# Patient Record
Sex: FEMALE | Race: WHITE | NOT HISPANIC OR LATINO | Employment: FULL TIME | URBAN - METROPOLITAN AREA
[De-identification: names, ages, dates, MRNs, and addresses within clinical notes are randomized per-mention and may not be internally consistent; named-entity substitution may affect disease eponyms.]

---

## 2020-03-05 LAB
ABO + RH BLD: NORMAL
ABO + RH BLD: NORMAL
BLD GP AB SCN SERPL QL: NEGATIVE
C TRACH DNA SPEC QL PROBE+SIG AMP: NEGATIVE
HBV SURFACE AG SERPL QL IA: NON REACTIVE
HIV 1+2 AB+HIV1 P24 AG SERPL QL IA: NON REACTIVE
N GONORRHOEA DNA SPEC QL PROBE+SIG AMP: NEGATIVE
RUBELLA ABY IGG: NORMAL
TREPONEMA ANTIBODIES: NON REACTIVE

## 2020-05-05 ENCOUNTER — MEDICAL CORRESPONDENCE (OUTPATIENT)
Dept: HEALTH INFORMATION MANAGEMENT | Facility: CLINIC | Age: 35
End: 2020-05-05

## 2020-05-05 ENCOUNTER — TRANSFERRED RECORDS (OUTPATIENT)
Dept: HEALTH INFORMATION MANAGEMENT | Facility: CLINIC | Age: 35
End: 2020-05-05

## 2020-05-05 ENCOUNTER — PRE VISIT (OUTPATIENT)
Dept: MATERNAL FETAL MEDICINE | Facility: CLINIC | Age: 35
End: 2020-05-05

## 2020-05-05 ENCOUNTER — TRANSCRIBE ORDERS (OUTPATIENT)
Dept: MATERNAL FETAL MEDICINE | Facility: CLINIC | Age: 35
End: 2020-05-05

## 2020-05-05 DIAGNOSIS — O26.90 PREGNANCY RELATED CONDITION, ANTEPARTUM: Primary | ICD-10-CM

## 2020-05-06 ENCOUNTER — HOSPITAL ENCOUNTER (OUTPATIENT)
Dept: ULTRASOUND IMAGING | Facility: CLINIC | Age: 35
End: 2020-05-06
Attending: OBSTETRICS & GYNECOLOGY
Payer: COMMERCIAL

## 2020-05-06 ENCOUNTER — OFFICE VISIT (OUTPATIENT)
Dept: MATERNAL FETAL MEDICINE | Facility: CLINIC | Age: 35
End: 2020-05-06
Attending: OBSTETRICS & GYNECOLOGY
Payer: COMMERCIAL

## 2020-05-06 DIAGNOSIS — O26.90 PREGNANCY RELATED CONDITION, ANTEPARTUM: ICD-10-CM

## 2020-05-06 DIAGNOSIS — O35.BXX0 ECHOGENIC FOCUS OF HEART OF FETUS AFFECTING ANTEPARTUM CARE OF MOTHER, SINGLE OR UNSPECIFIED FETUS: Primary | ICD-10-CM

## 2020-05-06 DIAGNOSIS — O43.122 VELAMENTOUS INSERTION OF UMBILICAL CORD IN SECOND TRIMESTER: ICD-10-CM

## 2020-05-06 PROCEDURE — 76811 OB US DETAILED SNGL FETUS: CPT

## 2020-08-26 LAB — GROUP B STREP PCR: NEGATIVE

## 2020-09-28 ENCOUNTER — ANESTHESIA EVENT (OUTPATIENT)
Dept: OBGYN | Facility: CLINIC | Age: 35
End: 2020-09-28
Payer: COMMERCIAL

## 2020-09-28 ENCOUNTER — ANESTHESIA (OUTPATIENT)
Dept: OBGYN | Facility: CLINIC | Age: 35
End: 2020-09-28
Payer: COMMERCIAL

## 2020-09-28 ENCOUNTER — HOSPITAL ENCOUNTER (INPATIENT)
Facility: CLINIC | Age: 35
LOS: 4 days | Discharge: HOME | End: 2020-10-02
Attending: MIDWIFE | Admitting: OBSTETRICS & GYNECOLOGY
Payer: COMMERCIAL

## 2020-09-28 DIAGNOSIS — Z98.891 S/P C-SECTION: Primary | ICD-10-CM

## 2020-09-28 LAB
ABO + RH BLD: NORMAL
BLD GP AB SCN SERPL QL: NORMAL
BLOOD BANK CMNT PATIENT-IMP: NORMAL
ERYTHROCYTE [DISTWIDTH] IN BLOOD BY AUTOMATED COUNT: 13.5 % (ref 10–15)
HCT VFR BLD AUTO: 39.5 % (ref 35–47)
HGB BLD-MCNC: 13.2 G/DL (ref 11.7–15.7)
LABORATORY COMMENT REPORT: NORMAL
MCH RBC QN AUTO: 31.6 PG (ref 26.5–33)
MCHC RBC AUTO-ENTMCNC: 33.4 G/DL (ref 31.5–36.5)
MCV RBC AUTO: 95 FL (ref 78–100)
PLATELET # BLD AUTO: 255 10E9/L (ref 150–450)
RBC # BLD AUTO: 4.18 10E12/L (ref 3.8–5.2)
RUPTURE OF FETAL MEMBRANES BY ROM PLUS: POSITIVE
SARS-COV-2 RNA SPEC QL NAA+PROBE: NEGATIVE
SARS-COV-2 RNA SPEC QL NAA+PROBE: NORMAL
SPECIMEN EXP DATE BLD: NORMAL
SPECIMEN EXP DATE BLD: NORMAL
SPECIMEN SOURCE: NORMAL
SPECIMEN SOURCE: NORMAL
T PALLIDUM AB SER QL: NONREACTIVE
WBC # BLD AUTO: 19.3 10E9/L (ref 4–11)

## 2020-09-28 PROCEDURE — U0003 INFECTIOUS AGENT DETECTION BY NUCLEIC ACID (DNA OR RNA); SEVERE ACUTE RESPIRATORY SYNDROME CORONAVIRUS 2 (SARS-COV-2) (CORONAVIRUS DISEASE [COVID-19]), AMPLIFIED PROBE TECHNIQUE, MAKING USE OF HIGH THROUGHPUT TECHNOLOGIES AS DESCRIBED BY CMS-2020-01-R: HCPCS | Performed by: MIDWIFE

## 2020-09-28 PROCEDURE — 25000128 H RX IP 250 OP 636: Performed by: ANESTHESIOLOGY

## 2020-09-28 PROCEDURE — 12000000 ZZH R&B MED SURG/OB

## 2020-09-28 PROCEDURE — 86900 BLOOD TYPING SEROLOGIC ABO: CPT | Performed by: MIDWIFE

## 2020-09-28 PROCEDURE — 37000008 ZZH ANESTHESIA TECHNICAL FEE, 1ST 30 MIN: Performed by: OBSTETRICS & GYNECOLOGY

## 2020-09-28 PROCEDURE — 37000011 ZZH ANESTHESIA WARD SERVICE

## 2020-09-28 PROCEDURE — 59025 FETAL NON-STRESS TEST: CPT

## 2020-09-28 PROCEDURE — 85027 COMPLETE CBC AUTOMATED: CPT | Performed by: MIDWIFE

## 2020-09-28 PROCEDURE — 36415 COLL VENOUS BLD VENIPUNCTURE: CPT | Performed by: MIDWIFE

## 2020-09-28 PROCEDURE — 84112 EVAL AMNIOTIC FLUID PROTEIN: CPT | Performed by: MIDWIFE

## 2020-09-28 PROCEDURE — 25800030 ZZH RX IP 258 OP 636: Performed by: NURSE ANESTHETIST, CERTIFIED REGISTERED

## 2020-09-28 PROCEDURE — 25000128 H RX IP 250 OP 636: Performed by: NURSE ANESTHETIST, CERTIFIED REGISTERED

## 2020-09-28 PROCEDURE — 86850 RBC ANTIBODY SCREEN: CPT | Performed by: MIDWIFE

## 2020-09-28 PROCEDURE — 36000056 ZZH SURGERY LEVEL 3 1ST 30 MIN: Performed by: OBSTETRICS & GYNECOLOGY

## 2020-09-28 PROCEDURE — 25000125 ZZHC RX 250: Performed by: NURSE ANESTHETIST, CERTIFIED REGISTERED

## 2020-09-28 PROCEDURE — 3E0R3BZ INTRODUCTION OF ANESTHETIC AGENT INTO SPINAL CANAL, PERCUTANEOUS APPROACH: ICD-10-PCS | Performed by: ANESTHESIOLOGY

## 2020-09-28 PROCEDURE — 71000012 ZZH RECOVERY PHASE 1 LEVEL 1 FIRST HR: Performed by: OBSTETRICS & GYNECOLOGY

## 2020-09-28 PROCEDURE — 37000009 ZZH ANESTHESIA TECHNICAL FEE, EACH ADDTL 15 MIN: Performed by: OBSTETRICS & GYNECOLOGY

## 2020-09-28 PROCEDURE — 27210794 ZZH OR GENERAL SUPPLY STERILE: Performed by: OBSTETRICS & GYNECOLOGY

## 2020-09-28 PROCEDURE — 86901 BLOOD TYPING SEROLOGIC RH(D): CPT | Performed by: MIDWIFE

## 2020-09-28 PROCEDURE — 25000125 ZZHC RX 250: Performed by: MIDWIFE

## 2020-09-28 PROCEDURE — 25000132 ZZH RX MED GY IP 250 OP 250 PS 637: Performed by: MIDWIFE

## 2020-09-28 PROCEDURE — 25800030 ZZH RX IP 258 OP 636: Performed by: MIDWIFE

## 2020-09-28 PROCEDURE — G0463 HOSPITAL OUTPT CLINIC VISIT: HCPCS | Mod: 25

## 2020-09-28 PROCEDURE — 36000058 ZZH SURGERY LEVEL 3 EA 15 ADDTL MIN: Performed by: OBSTETRICS & GYNECOLOGY

## 2020-09-28 PROCEDURE — 00HU33Z INSERTION OF INFUSION DEVICE INTO SPINAL CANAL, PERCUTANEOUS APPROACH: ICD-10-PCS | Performed by: ANESTHESIOLOGY

## 2020-09-28 PROCEDURE — 25000128 H RX IP 250 OP 636: Performed by: MIDWIFE

## 2020-09-28 PROCEDURE — 86780 TREPONEMA PALLIDUM: CPT | Performed by: MIDWIFE

## 2020-09-28 RX ORDER — FENTANYL CITRATE 50 UG/ML
50-100 INJECTION, SOLUTION INTRAMUSCULAR; INTRAVENOUS
Status: DISCONTINUED | OUTPATIENT
Start: 2020-09-28 | End: 2020-09-29

## 2020-09-28 RX ORDER — LIDOCAINE 40 MG/G
CREAM TOPICAL
Status: DISCONTINUED | OUTPATIENT
Start: 2020-09-28 | End: 2020-09-29

## 2020-09-28 RX ORDER — OXYTOCIN/0.9 % SODIUM CHLORIDE 30/500 ML
PLASTIC BAG, INJECTION (ML) INTRAVENOUS PRN
Status: DISCONTINUED | OUTPATIENT
Start: 2020-09-28 | End: 2020-09-28

## 2020-09-28 RX ORDER — AZITHROMYCIN 500 MG/1
500 INJECTION, POWDER, LYOPHILIZED, FOR SOLUTION INTRAVENOUS
Status: COMPLETED | OUTPATIENT
Start: 2020-09-28 | End: 2020-09-28

## 2020-09-28 RX ORDER — TERBUTALINE SULFATE 1 MG/ML
0.25 INJECTION, SOLUTION SUBCUTANEOUS
Status: DISCONTINUED | OUTPATIENT
Start: 2020-09-28 | End: 2020-09-29

## 2020-09-28 RX ORDER — OXYTOCIN/0.9 % SODIUM CHLORIDE 30/500 ML
PLASTIC BAG, INJECTION (ML) INTRAVENOUS CONTINUOUS PRN
Status: DISCONTINUED | OUTPATIENT
Start: 2020-09-28 | End: 2020-09-29

## 2020-09-28 RX ORDER — ONDANSETRON 2 MG/ML
4 INJECTION INTRAMUSCULAR; INTRAVENOUS EVERY 6 HOURS PRN
Status: DISCONTINUED | OUTPATIENT
Start: 2020-09-28 | End: 2020-09-29

## 2020-09-28 RX ORDER — ONDANSETRON 4 MG/1
4 TABLET, ORALLY DISINTEGRATING ORAL EVERY 6 HOURS PRN
Status: DISCONTINUED | OUTPATIENT
Start: 2020-09-28 | End: 2020-09-29

## 2020-09-28 RX ORDER — ROPIVACAINE HYDROCHLORIDE 2 MG/ML
10 INJECTION, SOLUTION EPIDURAL; INFILTRATION; PERINEURAL ONCE
Status: COMPLETED | OUTPATIENT
Start: 2020-09-28 | End: 2020-09-28

## 2020-09-28 RX ORDER — SODIUM CHLORIDE, SODIUM LACTATE, POTASSIUM CHLORIDE, CALCIUM CHLORIDE 600; 310; 30; 20 MG/100ML; MG/100ML; MG/100ML; MG/100ML
INJECTION, SOLUTION INTRAVENOUS CONTINUOUS
Status: DISCONTINUED | OUTPATIENT
Start: 2020-09-28 | End: 2020-09-29

## 2020-09-28 RX ORDER — MISOPROSTOL 100 UG/1
25 TABLET ORAL
Status: DISCONTINUED | OUTPATIENT
Start: 2020-09-28 | End: 2020-09-29

## 2020-09-28 RX ORDER — NALBUPHINE HYDROCHLORIDE 10 MG/ML
2.5-5 INJECTION, SOLUTION INTRAMUSCULAR; INTRAVENOUS; SUBCUTANEOUS EVERY 6 HOURS PRN
Status: DISCONTINUED | OUTPATIENT
Start: 2020-09-28 | End: 2020-09-29

## 2020-09-28 RX ORDER — ONDANSETRON 2 MG/ML
INJECTION INTRAMUSCULAR; INTRAVENOUS PRN
Status: DISCONTINUED | OUTPATIENT
Start: 2020-09-28 | End: 2020-09-29

## 2020-09-28 RX ORDER — ESCITALOPRAM OXALATE 10 MG/1
10 TABLET ORAL DAILY
Status: ON HOLD | COMMUNITY
End: 2023-12-02

## 2020-09-28 RX ORDER — EPHEDRINE SULFATE 50 MG/ML
5 INJECTION, SOLUTION INTRAMUSCULAR; INTRAVENOUS; SUBCUTANEOUS
Status: DISCONTINUED | OUTPATIENT
Start: 2020-09-28 | End: 2020-09-29

## 2020-09-28 RX ORDER — TRANEXAMIC ACID 10 MG/ML
1 INJECTION, SOLUTION INTRAVENOUS EVERY 30 MIN PRN
Status: DISCONTINUED | OUTPATIENT
Start: 2020-09-28 | End: 2020-09-29

## 2020-09-28 RX ORDER — MORPHINE SULFATE 1 MG/ML
INJECTION, SOLUTION EPIDURAL; INTRATHECAL; INTRAVENOUS PRN
Status: DISCONTINUED | OUTPATIENT
Start: 2020-09-28 | End: 2020-09-29

## 2020-09-28 RX ORDER — LIDOCAINE HYDROCHLORIDE AND EPINEPHRINE BITARTRATE 20; .01 MG/ML; MG/ML
INJECTION, SOLUTION SUBCUTANEOUS PRN
Status: DISCONTINUED | OUTPATIENT
Start: 2020-09-28 | End: 2020-09-29

## 2020-09-28 RX ORDER — CARBOPROST TROMETHAMINE 250 UG/ML
250 INJECTION, SOLUTION INTRAMUSCULAR
Status: DISCONTINUED | OUTPATIENT
Start: 2020-09-28 | End: 2020-09-29

## 2020-09-28 RX ORDER — CEFAZOLIN SODIUM 2 G/100ML
2 INJECTION, SOLUTION INTRAVENOUS
Status: COMPLETED | OUTPATIENT
Start: 2020-09-28 | End: 2020-09-28

## 2020-09-28 RX ORDER — NALOXONE HYDROCHLORIDE 0.4 MG/ML
.1-.4 INJECTION, SOLUTION INTRAMUSCULAR; INTRAVENOUS; SUBCUTANEOUS
Status: DISCONTINUED | OUTPATIENT
Start: 2020-09-28 | End: 2020-09-29

## 2020-09-28 RX ORDER — OXYTOCIN/0.9 % SODIUM CHLORIDE 30/500 ML
1-24 PLASTIC BAG, INJECTION (ML) INTRAVENOUS CONTINUOUS
Status: DISCONTINUED | OUTPATIENT
Start: 2020-09-28 | End: 2020-09-29

## 2020-09-28 RX ORDER — ACETAMINOPHEN 325 MG/1
650 TABLET ORAL EVERY 4 HOURS PRN
Status: DISCONTINUED | OUTPATIENT
Start: 2020-09-28 | End: 2020-09-29

## 2020-09-28 RX ORDER — OXYTOCIN 10 [USP'U]/ML
10 INJECTION, SOLUTION INTRAMUSCULAR; INTRAVENOUS
Status: DISCONTINUED | OUTPATIENT
Start: 2020-09-28 | End: 2020-09-29

## 2020-09-28 RX ORDER — OXYTOCIN/0.9 % SODIUM CHLORIDE 30/500 ML
100-340 PLASTIC BAG, INJECTION (ML) INTRAVENOUS CONTINUOUS PRN
Status: DISCONTINUED | OUTPATIENT
Start: 2020-09-28 | End: 2020-09-29

## 2020-09-28 RX ORDER — METHYLERGONOVINE MALEATE 0.2 MG/ML
200 INJECTION INTRAVENOUS
Status: DISCONTINUED | OUTPATIENT
Start: 2020-09-28 | End: 2020-09-29

## 2020-09-28 RX ORDER — IBUPROFEN 400 MG/1
800 TABLET, FILM COATED ORAL
Status: DISCONTINUED | OUTPATIENT
Start: 2020-09-28 | End: 2020-09-29

## 2020-09-28 RX ORDER — CEFAZOLIN SODIUM 1 G/3ML
1 INJECTION, POWDER, FOR SOLUTION INTRAMUSCULAR; INTRAVENOUS SEE ADMIN INSTRUCTIONS
Status: DISCONTINUED | OUTPATIENT
Start: 2020-09-28 | End: 2020-09-29

## 2020-09-28 RX ORDER — CITRIC ACID/SODIUM CITRATE 334-500MG
30 SOLUTION, ORAL ORAL
Status: COMPLETED | OUTPATIENT
Start: 2020-09-28 | End: 2020-09-28

## 2020-09-28 RX ORDER — OXYCODONE AND ACETAMINOPHEN 5; 325 MG/1; MG/1
1 TABLET ORAL
Status: DISCONTINUED | OUTPATIENT
Start: 2020-09-28 | End: 2020-09-29

## 2020-09-28 RX ADMIN — Medication 340 ML/HR: at 23:14

## 2020-09-28 RX ADMIN — ONDANSETRON 4 MG: 2 INJECTION INTRAMUSCULAR; INTRAVENOUS at 23:06

## 2020-09-28 RX ADMIN — CEFAZOLIN SODIUM 2 G: 2 INJECTION, SOLUTION INTRAVENOUS at 22:45

## 2020-09-28 RX ADMIN — SODIUM CHLORIDE, POTASSIUM CHLORIDE, SODIUM LACTATE AND CALCIUM CHLORIDE: 600; 310; 30; 20 INJECTION, SOLUTION INTRAVENOUS at 12:50

## 2020-09-28 RX ADMIN — MISOPROSTOL 25 MCG: 100 TABLET ORAL at 08:46

## 2020-09-28 RX ADMIN — SODIUM CITRATE AND CITRIC ACID MONOHYDRATE 30 ML: 500; 334 SOLUTION ORAL at 22:35

## 2020-09-28 RX ADMIN — LIDOCAINE HYDROCHLORIDE AND EPINEPHRINE BITARTRATE 5 ML: 20; .01 INJECTION, SOLUTION SUBCUTANEOUS at 22:45

## 2020-09-28 RX ADMIN — Medication 12 ML/HR: at 19:08

## 2020-09-28 RX ADMIN — AZITHROMYCIN MONOHYDRATE 500 MG: 500 INJECTION, POWDER, LYOPHILIZED, FOR SOLUTION INTRAVENOUS at 22:57

## 2020-09-28 RX ADMIN — PHENYLEPHRINE HYDROCHLORIDE 0.5 MCG/KG/MIN: 10 INJECTION INTRAVENOUS at 22:45

## 2020-09-28 RX ADMIN — PHENYLEPHRINE HYDROCHLORIDE 100 MCG: 10 INJECTION INTRAVENOUS at 23:05

## 2020-09-28 RX ADMIN — LIDOCAINE HYDROCHLORIDE AND EPINEPHRINE BITARTRATE 5 ML: 20; .01 INJECTION, SOLUTION SUBCUTANEOUS at 22:51

## 2020-09-28 RX ADMIN — MISOPROSTOL 25 MCG: 100 TABLET ORAL at 06:44

## 2020-09-28 RX ADMIN — ROPIVACAINE HYDROCHLORIDE 10 ML: 2 INJECTION, SOLUTION EPIDURAL; INFILTRATION at 19:05

## 2020-09-28 RX ADMIN — LIDOCAINE HYDROCHLORIDE AND EPINEPHRINE BITARTRATE 5 ML: 20; .01 INJECTION, SOLUTION SUBCUTANEOUS at 22:55

## 2020-09-28 RX ADMIN — SODIUM CHLORIDE, POTASSIUM CHLORIDE, SODIUM LACTATE AND CALCIUM CHLORIDE 1000 ML: 600; 310; 30; 20 INJECTION, SOLUTION INTRAVENOUS at 18:20

## 2020-09-28 RX ADMIN — MIDAZOLAM 1 MG: 1 INJECTION INTRAMUSCULAR; INTRAVENOUS at 23:19

## 2020-09-28 RX ADMIN — Medication 2 MILLI-UNITS/MIN: at 12:55

## 2020-09-28 RX ADMIN — MORPHINE SULFATE 0.1 MG: 1 INJECTION, SOLUTION EPIDURAL; INTRATHECAL; INTRAVENOUS at 23:18

## 2020-09-28 ASSESSMENT — LIFESTYLE VARIABLES
TOBACCO_USE: 0
TOBACCO_USE: 0

## 2020-09-28 NOTE — PLAN OF CARE
Took over patient care at 0715. At 0730 Emani GILBERT here and was updated. Plan of care gone over with patient and spouse. At 0846 @nd dose of Oral Cytotec given. At 1045 Emani GILBERT updated on maternal status and fht's. Ordered not to given anynore cytotec due to possibly starting pitocin soon Emani will be here to assess and make plan. At 1225 SVE done and plan of care gone over with patient. Pitocin will be started soon. At 1255 Pitocin started per orders. At 1415 Loss of capture with shaheen so patch replaced.  At this time as well Patient starting to feel more. At 1530 Emani GILBERT upadted on maternal status and fht's and reviewed strip and decels. At 1655 Emani GILBERT at bedside and SVE done. Reviewed strip and decels at this time. At 1758 Patient requested Nitrous oxide and consent signed. Patient tried use for 10 min and then declined use due not liking after two contractions. At 1820 Patinet requested an epidural. At 1830 Emani GILBERT called and updated on maternal status and fht's. At 1835 SVE done due to patient request. At 1855 Dr. Aguero in room for epidural procedure. Timeout done. At 1920 Report to Kaylah HUERTA RN to take over patient cares. At 1944 Emani GILBERT called and upadte on mater nal status and fh't and decels. She will be coming now to assess patient.

## 2020-09-28 NOTE — PLAN OF CARE
Patient reports increased intensity and frequency of contractions. Rating the pain 2/10 and lower uterine cramping. When discussing plan of care with patient for continuous monitoring to begin at 0500 in preparation for PO cytotec admin, patient requests the time to be 0600 to start monitoring. RN will perform IA at 0500.

## 2020-09-28 NOTE — PROVIDER NOTIFICATION
09/28/20 0635   Provider Notification   Provider Name/Title PARK Guajardo CNM   Method of Notification Electronic Page   Request Evaluate - Remote   Notification Reason Status Update   Per provider to notify when starting PO cytotec and cervical ripening order set.

## 2020-09-28 NOTE — PROVIDER NOTIFICATION
09/28/20 0208   Provider Notification   Provider Name/Title PARK Guajardo CNM   Method of Notification Electronic Page;Phone   Request Evaluate - Remote   Notification Reason Status Update   Provider approves awaiting until PO cytotec is started to place IV saline lock. Pt care order taken and entered.

## 2020-09-28 NOTE — ANESTHESIA PREPROCEDURE EVALUATION
Anesthesia Pre-Procedure Evaluation    Patient: Alena Hall   MRN: [id number] : 1985          Preoperative Diagnosis: * No surgery found *        Past Medical History:   Diagnosis Date     Anxiety      Depression     on meds     Homozygous MTHFR mutation C677T (H) 2013    normal homocystein level     No past surgical history on file.    Anesthesia Evaluation     . Pt has had prior anesthetic.     No history of anesthetic complications          ROS/MED HX    ENT/Pulmonary:      (-) tobacco use, asthma and sleep apnea   Neurologic:       Cardiovascular:         METS/Exercise Tolerance:     Hematologic:         Musculoskeletal:         GI/Hepatic:        (-) GERD   Renal/Genitourinary:         Endo:         Psychiatric:         Infectious Disease:         Malignancy:         Other:                          Physical Exam  Normal systems: dental    Airway   Mallampati: II  TM distance: >3 FB  Neck ROM: full    Dental     Cardiovascular   Rhythm and rate: regular and normal      Pulmonary    breath sounds clear to auscultation            Lab Results   Component Value Date    PTT 31 2013    INR 1.0 2013       Preop Vitals  BP Readings from Last 3 Encounters:   20 136/84   14 135/89    Pulse Readings from Last 3 Encounters:   20 97   14 87      Resp Readings from Last 3 Encounters:   20 16   14 17    SpO2 Readings from Last 3 Encounters:   14 99%      Temp Readings from Last 1 Encounters:   20 36.6  C (97.8  F) (Temporal)    Ht Readings from Last 1 Encounters:   No data found for Ht      Wt Readings from Last 1 Encounters:   14 94.3 kg (208 lb)    There is no height or weight on file to calculate BMI.       Anesthesia Plan      History & Physical Review  History and physical reviewed and following examination; no interval change.    ASA Status:  1 .        Plan for Epidural            Postoperative Care  Postoperative pain management:   Neuraxial analgesia.      Consents  Anesthetic plan, risks, benefits and alternatives discussed with:  Patient..                 Flora Aguero

## 2020-09-28 NOTE — PLAN OF CARE
Patient and spouse oriented to room, call light, bathroom, and thermostat. Per patient request, waiting 3 hours to start PO cytotec, with intermittent auscultation until start of medication. Patient plans to rest. No change in strength of contractions at this time, patient reports tightening. Ice water given, call light in reach. Plan to start 1 hour auscultation at 0300. Pt agrees with plan.

## 2020-09-28 NOTE — PROGRESS NOTES
OB Progress Note  2020  12:36 PM    S:  Pt comfortable. Feeling occasional contractions. Ambulating and eating lunch  No other complaints.      O:  /63   Pulse 76   Temp 98.7  F (37.1  C) (Temporal)   Resp 16   LMP 2019   EFM: baseline 140, accelerations present, occasional variable decelerations, moderate variability; Category 2  [place]:  Ctx q5-9 min  SVE:  1/70%/-2  Membranes: PROM @ 2300 on , clear fluid        A/P:  35 year old  @40w6d admitted with PROM  1.  Routine cares   2. Augmentation of labor: s/p PO cytotec x 2. Last dose @ 0846. R/B/A of additional cytotec vs. Pitocin discussed. Plan to start IV pitocin.   3. ROM x 13 hrs: afebrile, VSS  4. FHR: Cat II, overall reassuring. Continuous monitoring  5. Pain relief per patient request.  6. Reassess 4 hr or PRN  7. Anticipate NSVB    Rosa Murdock CNM

## 2020-09-28 NOTE — H&P
OB Brief Admit H&P    No significant change in general health status based on examination of the patient, review of Nursing Admission Database and prenatal record.    Pt is a 35 year old  @ 40w6d who presented to L&D with reports of PROM at 2300 on 2020. Reports moderate amt of clear fluid accompanied by mild cramping.    Patient's prenatal course has been complicated by velamentous cord insertion, CPC and EIF on 20wk anatomy scan (Mat 21 normal), hx anxiety/depression (not on meds), obesity.     Prenatal Labs:    Blood type O+  Rubella immune    GBS negative  Mat 21: WNL    EFW: 8lbs    /74   Pulse 72   Temp 97.5  F (36.4  C) (Temporal)   Resp 18   LMP 2019   EFM:  Cat I: baseline 130, accels present, no decels, moderate variability  [place]: 5-7 ctx / min. Mild to palpation and to patient  SVE: 1/70%/-2 on admission  Membranes: PROM at 2300, clear fluid    Assessment:  35 year old  @ 40w6d admitted for PROM.     Plan:  1. Admit to labor and delivery   2. Pt elected for expectant management overnight. Still with mild cramping early this morning. R/B/A labor augmentation discussed and pt agrees to proceed. 1st dose PO cytotec 25mcg administered 0645.  3. ROM x 9 hrs: afebrile, VSS  4. FHR: Cat I. Continuous monitoring  5. Covid swab negative  6. Pain relief per patient request. Planning unmedicated delivery.   7. Reassess 2-4 hr or PRN  8. Anticipate LEATHA Murdock CNM  2020  7:53 AM

## 2020-09-28 NOTE — PLAN OF CARE
Data: Patient presented to [facility] at 0056.   Reason for maternal/fetal assessment per patient is Spontaneous Rupture of Membrane  Patient is a . Prenatal record reviewed.   Gestational Age 40w6d. VSS. Fetal movement present. Patient states her water broke at 2300 and she is having mild contractions. Patient denies backache, vaginal discharge, pelvic pressure, UTI symptoms, GI problems, bloody show, vaginal bleeding, edema, headache, visual disturbances, epigastric or URQ pain, abdominal pain. Support person present.  Action: Verbal consent for EFM. Triage assessment completed. EFM applied for fetal wellbeing. Uterine assessment completed; occasional contractions noted. Fetal assessment: Presumed adequate fetal oxygenation documented (see flow record).   Response: Linh Guajardo CNM informed of patient arrival, ROM+ results, SVE, contractions, and FHR tracing. Plan per provider is admit to L&D. Patient verbalized agreement with plan. Patient transferred to room 212 ambulatory, oriented to room and call light. Report given to Sahra Davis RN.

## 2020-09-28 NOTE — PROGRESS NOTES
OB Progress Note  2020  5:34 PM    S:  Pt increasingly uncomfortable, working hard through contractions. Doing well with labor support from her aunt (acting as her ) and . Has been awake for 36 hrs and is very tired. Asking about pain control options.       O:  /84   Pulse 97   Temp 97.8  F (36.6  C) (Temporal)   Resp 16   LMP 2019   EFM: baseline 140, accelerations present, variable and early decelerations, moderate variability; Category II  [place]:  Ctx q3 min, some irritability on [place] monitor  SVE:  3/80%/-2  Membranes: clear fluid    Pitocin at 10 mU/min    A/P:  35 year old  @40w6d admitted with PROM  1.  Routine cares  2.  Augmentation of labor: s/p expectant management, PO cytotec x 2, now with IV pitocin  3. ROM x 16 hrs: afebrile, VSS  4. FHR: Cat II, overall reassuring. Continuous monitoring.   5. Pain relief options reviewed w/ pt. Per request.   6. Reassess 4 hr or PRN  7.  Anticipate     Rosa Murdock CNM

## 2020-09-28 NOTE — PLAN OF CARE
After obtaining verbal consent from patient, nasopharyngeal swab for COVID-19 test performed. Specimen labeled with patient label and hand delivered to laboratory.

## 2020-09-29 PROBLEM — Z98.891 S/P C-SECTION: Status: ACTIVE | Noted: 2020-09-29

## 2020-09-29 LAB — HGB BLD-MCNC: 11.8 G/DL (ref 11.7–15.7)

## 2020-09-29 PROCEDURE — 85018 HEMOGLOBIN: CPT | Performed by: MIDWIFE

## 2020-09-29 PROCEDURE — 25000125 ZZHC RX 250: Performed by: OBSTETRICS & GYNECOLOGY

## 2020-09-29 PROCEDURE — 12000035 ZZH R&B POSTPARTUM

## 2020-09-29 PROCEDURE — 25000132 ZZH RX MED GY IP 250 OP 250 PS 637: Performed by: OBSTETRICS & GYNECOLOGY

## 2020-09-29 PROCEDURE — 25800030 ZZH RX IP 258 OP 636: Performed by: MIDWIFE

## 2020-09-29 PROCEDURE — 36415 COLL VENOUS BLD VENIPUNCTURE: CPT | Performed by: MIDWIFE

## 2020-09-29 PROCEDURE — 25000128 H RX IP 250 OP 636: Performed by: OBSTETRICS & GYNECOLOGY

## 2020-09-29 RX ORDER — DEXTROSE, SODIUM CHLORIDE, SODIUM LACTATE, POTASSIUM CHLORIDE, AND CALCIUM CHLORIDE 5; .6; .31; .03; .02 G/100ML; G/100ML; G/100ML; G/100ML; G/100ML
INJECTION, SOLUTION INTRAVENOUS CONTINUOUS
Status: DISCONTINUED | OUTPATIENT
Start: 2020-09-29 | End: 2020-10-02 | Stop reason: HOSPADM

## 2020-09-29 RX ORDER — AMOXICILLIN 250 MG
2 CAPSULE ORAL 2 TIMES DAILY
Status: DISCONTINUED | OUTPATIENT
Start: 2020-09-29 | End: 2020-10-02 | Stop reason: HOSPADM

## 2020-09-29 RX ORDER — KETOROLAC TROMETHAMINE 30 MG/ML
30 INJECTION, SOLUTION INTRAMUSCULAR; INTRAVENOUS EVERY 6 HOURS
Status: COMPLETED | OUTPATIENT
Start: 2020-09-29 | End: 2020-09-29

## 2020-09-29 RX ORDER — AMOXICILLIN 250 MG
1 CAPSULE ORAL 2 TIMES DAILY
Status: DISCONTINUED | OUTPATIENT
Start: 2020-09-29 | End: 2020-10-02 | Stop reason: HOSPADM

## 2020-09-29 RX ORDER — OXYTOCIN/0.9 % SODIUM CHLORIDE 30/500 ML
340 PLASTIC BAG, INJECTION (ML) INTRAVENOUS CONTINUOUS PRN
Status: DISCONTINUED | OUTPATIENT
Start: 2020-09-29 | End: 2020-10-02 | Stop reason: HOSPADM

## 2020-09-29 RX ORDER — NALOXONE HYDROCHLORIDE 0.4 MG/ML
.1-.4 INJECTION, SOLUTION INTRAMUSCULAR; INTRAVENOUS; SUBCUTANEOUS
Status: DISCONTINUED | OUTPATIENT
Start: 2020-09-29 | End: 2020-10-02 | Stop reason: HOSPADM

## 2020-09-29 RX ORDER — IBUPROFEN 400 MG/1
800 TABLET, FILM COATED ORAL EVERY 6 HOURS
Status: DISCONTINUED | OUTPATIENT
Start: 2020-09-30 | End: 2020-10-02 | Stop reason: HOSPADM

## 2020-09-29 RX ORDER — ACETAMINOPHEN 325 MG/1
TABLET ORAL
Status: DISCONTINUED
Start: 2020-09-29 | End: 2020-09-29 | Stop reason: HOSPADM

## 2020-09-29 RX ORDER — ESCITALOPRAM OXALATE 10 MG/1
10 TABLET ORAL DAILY
Status: DISCONTINUED | OUTPATIENT
Start: 2020-09-29 | End: 2020-10-02 | Stop reason: HOSPADM

## 2020-09-29 RX ORDER — BISACODYL 10 MG
10 SUPPOSITORY, RECTAL RECTAL DAILY PRN
Status: DISCONTINUED | OUTPATIENT
Start: 2020-10-01 | End: 2020-10-02 | Stop reason: HOSPADM

## 2020-09-29 RX ORDER — HYDROCORTISONE 2.5 %
CREAM (GRAM) TOPICAL 3 TIMES DAILY PRN
Status: DISCONTINUED | OUTPATIENT
Start: 2020-09-29 | End: 2020-10-02 | Stop reason: HOSPADM

## 2020-09-29 RX ORDER — OXYCODONE HYDROCHLORIDE 5 MG/1
5 TABLET ORAL EVERY 4 HOURS PRN
Status: DISCONTINUED | OUTPATIENT
Start: 2020-09-29 | End: 2020-10-02 | Stop reason: HOSPADM

## 2020-09-29 RX ORDER — OXYTOCIN 10 [USP'U]/ML
10 INJECTION, SOLUTION INTRAMUSCULAR; INTRAVENOUS
Status: DISCONTINUED | OUTPATIENT
Start: 2020-09-29 | End: 2020-10-02 | Stop reason: HOSPADM

## 2020-09-29 RX ORDER — SIMETHICONE 80 MG
80 TABLET,CHEWABLE ORAL 4 TIMES DAILY PRN
Status: DISCONTINUED | OUTPATIENT
Start: 2020-09-29 | End: 2020-10-02 | Stop reason: HOSPADM

## 2020-09-29 RX ORDER — ONDANSETRON 2 MG/ML
4 INJECTION INTRAMUSCULAR; INTRAVENOUS EVERY 6 HOURS PRN
Status: DISCONTINUED | OUTPATIENT
Start: 2020-09-29 | End: 2020-10-02 | Stop reason: HOSPADM

## 2020-09-29 RX ORDER — ACETAMINOPHEN 325 MG/1
975 TABLET ORAL EVERY 6 HOURS
Status: DISCONTINUED | OUTPATIENT
Start: 2020-09-29 | End: 2020-10-02 | Stop reason: HOSPADM

## 2020-09-29 RX ORDER — LIDOCAINE 40 MG/G
CREAM TOPICAL
Status: DISCONTINUED | OUTPATIENT
Start: 2020-09-29 | End: 2020-10-02 | Stop reason: HOSPADM

## 2020-09-29 RX ORDER — MODIFIED LANOLIN
OINTMENT (GRAM) TOPICAL
Status: DISCONTINUED | OUTPATIENT
Start: 2020-09-29 | End: 2020-10-02 | Stop reason: HOSPADM

## 2020-09-29 RX ORDER — OXYTOCIN/0.9 % SODIUM CHLORIDE 30/500 ML
100 PLASTIC BAG, INJECTION (ML) INTRAVENOUS CONTINUOUS
Status: DISCONTINUED | OUTPATIENT
Start: 2020-09-29 | End: 2020-10-02 | Stop reason: HOSPADM

## 2020-09-29 RX ORDER — TRANEXAMIC ACID 10 MG/ML
1 INJECTION, SOLUTION INTRAVENOUS EVERY 30 MIN PRN
Status: DISCONTINUED | OUTPATIENT
Start: 2020-09-29 | End: 2020-10-02 | Stop reason: HOSPADM

## 2020-09-29 RX ADMIN — SODIUM CHLORIDE, POTASSIUM CHLORIDE, SODIUM LACTATE AND CALCIUM CHLORIDE: 600; 310; 30; 20 INJECTION, SOLUTION INTRAVENOUS at 00:06

## 2020-09-29 RX ADMIN — OXYCODONE HYDROCHLORIDE 5 MG: 5 TABLET ORAL at 08:03

## 2020-09-29 RX ADMIN — SODIUM CHLORIDE, SODIUM LACTATE, POTASSIUM CHLORIDE, CALCIUM CHLORIDE AND DEXTROSE MONOHYDRATE: 5; 600; 310; 30; 20 INJECTION, SOLUTION INTRAVENOUS at 08:24

## 2020-09-29 RX ADMIN — ACETAMINOPHEN 975 MG: 325 TABLET, FILM COATED ORAL at 19:36

## 2020-09-29 RX ADMIN — ACETAMINOPHEN 975 MG: 325 TABLET, FILM COATED ORAL at 01:27

## 2020-09-29 RX ADMIN — ACETAMINOPHEN 975 MG: 325 TABLET, FILM COATED ORAL at 08:00

## 2020-09-29 RX ADMIN — DOCUSATE SODIUM 50 MG AND SENNOSIDES 8.6 MG 1 TABLET: 8.6; 5 TABLET, FILM COATED ORAL at 19:37

## 2020-09-29 RX ADMIN — Medication 100 ML/HR: at 03:14

## 2020-09-29 RX ADMIN — KETOROLAC TROMETHAMINE 30 MG: 30 INJECTION, SOLUTION INTRAMUSCULAR at 03:45

## 2020-09-29 RX ADMIN — OXYCODONE HYDROCHLORIDE 5 MG: 5 TABLET ORAL at 20:48

## 2020-09-29 RX ADMIN — KETOROLAC TROMETHAMINE 30 MG: 30 INJECTION, SOLUTION INTRAMUSCULAR at 10:53

## 2020-09-29 RX ADMIN — ACETAMINOPHEN 975 MG: 325 TABLET, FILM COATED ORAL at 13:48

## 2020-09-29 RX ADMIN — ESCITALOPRAM OXALATE 10 MG: 10 TABLET ORAL at 08:00

## 2020-09-29 RX ADMIN — KETOROLAC TROMETHAMINE 30 MG: 30 INJECTION, SOLUTION INTRAMUSCULAR at 17:05

## 2020-09-29 RX ADMIN — DOCUSATE SODIUM 50 MG AND SENNOSIDES 8.6 MG 1 TABLET: 8.6; 5 TABLET, FILM COATED ORAL at 08:00

## 2020-09-29 NOTE — BRIEF OP NOTE
[facility] Brief Operative Note    Pre-operative diagnosis: 40+6 IUP; fetal intolerance of labor   Post-operative diagnosis same   Procedure: Primary LST    Surgeon(s): Surgeon(s) and Role:     * Chana Chun MD - Primary   Estimated blood loss: 400 cc 12:01 AM * ( )   Specimens: ID Type Source Tests Collected by Time Destination   1 :  Cord blood Blood OR DOCUMENTATION ONLY Evelin Banegas, RN 2020 11:12 PM    2 :  Tissue Umbilical Cord OR DOCUMENTATION ONLY Gemma Gavin, RN 2020 11:12 PM    3 :  Placenta Placenta SPECIMEN DISCARDED Chana Chun MD 2020 11:12 PM       Findings: Male, OP presentation, 7#9 oz; apgars 8/9; meconium stained fluid    No complications

## 2020-09-29 NOTE — PROGRESS NOTES
Asked by Chana Chun MD to attend CS d/t fetal intolerance to labor. Now at 40.6 weeks, male infant born at 2312. Infant was vigorous at birth. Delayed cord clamping was done X1 minute, infant was brought to warmer, dried, and bulb sxn. Apgars 8,9. Infant to normal .       Savanna GRIMM, CNP

## 2020-09-29 NOTE — PROGRESS NOTES
OB Progress Note  2020  9:52 PM    S:  Pt with comfortable with epidural. Some anxiety regarding FHR tracing.       O:  /76   Pulse 99   Temp 97.5  F (36.4  C) (Temporal)   Resp 16   LMP 2019   EFM: baseline 150, accelerations present, repetitive late decelerations, prolonged deceleration, moderate variability; Category II  [place]:  Ctx q2-3 min  SVE:  5-6/80%/-2  Membranes: PROM, clear fluid    Pitocin off     A/P:  35 year old  @40w6d admitted with  PROM  1.  Routine cares  2.  Augmentation of labor: s/p expectant management, PO cytotec x 2, IV pitocin. IUPC and FSE placed to better assess ctx pattern and FHR. Pitocin off at 2150.  3. ROM x 23 hrs: afebrile, VSS  4. FHR: Cat II. Continuous monitoring. Repetitive late decelerations. Occasional prolonged decel, most recently at 2153. Pitocin off, fluid bolus, pt repositioned. Dr. Chun notified.  recommended in setting of non reassuring fetal heart rate remote from delivery. Pt agrees to this decision. Orders placed and pre-op started.  5. Epidural  6. Delivery plan: . Dr. Chun en route.       Rosa Murdock CNM

## 2020-09-29 NOTE — PROGRESS NOTES
Report received. Assuming care at .   RN continuously at bedside monitoring fetal and maternal status.  OFELIA Lee continuously at bedside @ . Comfortable with epidural. Pitocin at 10. FHT Baseline 150, accelerations present, repetitive late decelerations and prolonged decelerations noted with moderate variability. Pt repositioned, bolus given, pitocin stopped at 2150. Cat II tracing continued with repetitive late decelerations. IUPC and FSE in place for ctx and FHT accuracy. Dr. Chun notified per CNM.  discussed in setting of non-reassuring fetal heart tones. Pt agreed to this decision. Orders placed. Pt prepped for C/S and to OR @ . Report to Shelly Banegas RN. Care transferred.

## 2020-09-29 NOTE — PROGRESS NOTES
Fundus firm and bleeding wnl.  VSS.  Incision clean dry and intact.  Rates pain 2-5/10 and taking scheduled tylenol, and Toradol with good relief.  Remains on bedrest but able to move around in bed well. Self positioning.  Working on advancing to a regular diet.  Tolerating crackers and juice. Working on breastfeeding with nipple shield. Encouraged to call with questions or concerns.  Will continue to monitor.

## 2020-09-29 NOTE — PLAN OF CARE
VSS, pain well controlled with Tylenol, Toradol and 5mg Oxycodone.  5mg of Oxycodone was given before ambulating, declined need for a dose this afternoon.  Ambulated well to bathroom.  FF@U, scant flow.  Dressing cdi.  Good oral intake, no nausea.  Camacho catheter removed at 1230 and IV saline locked.

## 2020-09-29 NOTE — PROGRESS NOTES
[facility] Labor and Delivery Progress Note    Alena Hall MRN# [id number]   Age: 35 year old YOB: 1985           Subjective:     Called by the CNMW to evaluate persistant level II tracing and remote from delivery.  at 40+6 with prolonged ruptured membranes, and repeatative decelerations.  Epidural in place. Pitocin has been discontinued ( was at 10 mU).          Objective:     Patient Vitals for the past 8 hrs:   BP Temp Temp src Pulse Resp Weight   20 2200 -- -- -- -- -- 116.1 kg (256 lb)   20 1920 127/76 -- -- 99 16 --   20 1910 132/63 -- -- 93 16 --   20 1835 -- 97.5  F (36.4  C) Temporal -- 16 --   20 1809 -- 97.8  F (36.6  C) Temporal -- -- --   20 1730 135/75 98.1  F (36.7  C) Temporal 88 16 --   20 1610 136/84 97.8  F (36.6  C) Temporal 97 16 --   20 1530 -- 98.1  F (36.7  C) Temporal -- 16 --        Cervical Exam:  5-6/80%/-2    Membranes: Leaking     Fetal Heart Rate:    Monitor:      Variability:   Average to decerased   Baseline Rate:   140 -150   Fetal Heart Rate Tracing: cat II : repetative late decelerations as well as a prolonged deceleration to 70's    GBS neg  COVID neg  EFW 8#          Assessment:   1)  IUP at  40w6d   2)  Prolonged rupture of membranes and Pitocin augmentation of labor  3) cat II tracing and remote from delivery        Plan:    recommended and consent signed.  Will proceed to OR      Chana Chun MD

## 2020-09-29 NOTE — LACTATION NOTE
"Initial visit with Alena, FOB and infant. Infant latched on and feeding with nipple shield. Alena has nipples on the smoother side. Infant fed well after delivery without nipple shield. Unable to maintain latch without at this time.    Breastfeeding general information reviewed. Advised to breastfeed exclusively, on demand, avoid pacifiers, bottles and formula unless medically indicated.    Encouraged rooming in, skin to skin, feeding on demand 8-12x/day or sooner if baby cues.  Explained benefits of holding and skin to skin. Discussed typical feeding pattern for the next 24-48 hours.     Breastfeeding going well per mother. Encouraged to read \"A New Beginning\". Patient thankful for LC support and advise.    All questions answered. No further questions at this time. Will follow as needed.     TOVA Heller RN, BSN, PHN, IBCLC    "

## 2020-09-29 NOTE — ANESTHESIA PROCEDURE NOTES
Procedure note : epidural catheter      Staff -   Anesthesiologist:  Flora Aguero  Performed By: anesthesiologist  Pre-Procedure  Performed by Flora Aguero  Location: OB      Pre-Anesthestic Checklist: patient identified, IV checked, site marked, risks and benefits discussed, informed consent, monitors and equipment checked, pre-op evaluation, at physician/surgeon's request and post-op pain management    Timeout  Correct Patient: Yes   Correct Procedure: Yes   Correct Site: Yes   Correct Laterality: N/A   Correct Position: Yes   Site Marked: N/A   .   Procedure Documentation    .    Procedure: epidural catheter, .   Patient Position:sitting Insertion Site:L3-4  (midline approach) Injection technique: LORT saline   Local skin infiltrated with 3 mL of 1% lidocaine.  HENRIETTA at 5 cm    Patient Prep/Sterile Barriers; sterile gloves, povidone-iodine 7.5% surgical scrub, patient draped.  .  Needle: Touhy needle   Needle Gauge: 18.    Needle Length (Inches) 3.5   # of attempts: 1 and # of redirects:  .    . .  Catheter threaded easily  3 cm epidural space.  8 cm at skin.   .    Assessment/Narrative  Paresthesias: No.  .  .  Aspiration negative for heme or CSF  . Test dose of mL lidocaine 1.5% w/ 1:200,000 epinephrine at. Test dose negative for signs of intravascular, subdural or intrathecal injection.

## 2020-09-29 NOTE — ANESTHESIA PREPROCEDURE EVALUATION
Anesthesia Pre-Procedure Evaluation    Patient: Alena Hall   MRN: [id number] : 1985          Preoperative Diagnosis: 40 weeks gestation of pregnancy [Z3A.40]    Procedure(s):   SECTION    Past Medical History:   Diagnosis Date     Anxiety      Depression     on meds     Homozygous MTHFR mutation C677T (H) 2013    normal homocystein level     No past surgical history on file.    Anesthesia Evaluation     . Pt has had prior anesthetic.            ROS/MED HX    ENT/Pulmonary:      (-) tobacco use and asthma   Neurologic:       Cardiovascular:         METS/Exercise Tolerance:     Hematologic:         Musculoskeletal:         GI/Hepatic:        (-) GERD   Renal/Genitourinary:         Endo:         Psychiatric:         Infectious Disease:         Malignancy:         Other:                          Physical Exam  Normal systems: dental    Airway   Mallampati: I  TM distance: >3 FB  Neck ROM: full    Dental     Cardiovascular   Rhythm and rate: regular and normal      Pulmonary    breath sounds clear to auscultation            Lab Results   Component Value Date    PTT 31 2013    INR 1.0 2013       Preop Vitals  BP Readings from Last 3 Encounters:   20 127/76   14 135/89    Pulse Readings from Last 3 Encounters:   20 99   14 87      Resp Readings from Last 3 Encounters:   20 16   14 17    SpO2 Readings from Last 3 Encounters:   14 99%      Temp Readings from Last 1 Encounters:   20 36.4  C (97.5  F) (Temporal)    Ht Readings from Last 1 Encounters:   No data found for Ht      Wt Readings from Last 1 Encounters:   14 94.3 kg (208 lb)    There is no height or weight on file to calculate BMI.       Anesthesia Plan      History & Physical Review  History and physical reviewed and following examination; no interval change.    ASA Status:  1 .        Plan for Epidural   PONV prophylaxis:  Ondansetron (or other 5HT-3)         Postoperative  Care  Postoperative pain management:  Neuraxial analgesia.      Consents  Anesthetic plan, risks, benefits and alternatives discussed with:  Patient..                 Flora Aguero

## 2020-09-29 NOTE — ANESTHESIA CARE TRANSFER NOTE
Patient: Alena Hall    Procedure(s):   SECTION    Diagnosis: 40 weeks gestation of pregnancy [Z3A.40]  Diagnosis Additional Information: No value filed.    Anesthesia Type:   Epidural     Note:  Airway :Room Air  Patient transferred to:Labor and Delivery  Comments: Transferred to OB PACU recovery, spontaneous respirations on room air with oxygen saturations maintained greater than 98%. SpO2, NiBP, and EKG monitors and alarms on and functioning, report on patient's clinical status given to OB recovery RN, RN questions answered, patient in hospital bed with siderails up Oxytocin 30 units in 500mL infusion connected to IV infusion pump in recovery bay and programmed to 100 mL/hr at handoff of care. Epidural removed, tip intact.  Handoff Report: Identifed the Patient, Identified the Reponsible Provider, Reviewed the pertinent medical history, Discussed the surgical course, Reviewed Intra-OP anesthesia mangement and issues during anesthesia, Set expectations for post-procedure period and Allowed opportunity for questions and acknowledgement of understanding      Vitals: (Last set prior to Anesthesia Care Transfer)    CRNA VITALS  2020 2336 - 2020 0016      2020             Resp Rate (set):  10                Electronically Signed By: SIRISHA Cano CRNA  2020  12:16 AM

## 2020-09-29 NOTE — PLAN OF CARE
0215  Transferred to [facility] room 421 via bed. Baby transferred via mothers arms. Accompanied by Registered Nurses.  Personal belongings moved with patient to [facility]. Report given to Kaylah HUERTA RN.  Fundus and lochia checked with receiving RN.  Patient tolerated transfer and is stable.    ID bands double-checked with receiving RN.

## 2020-09-29 NOTE — PROGRESS NOTES
Pt. admitted from L&D via bed.. Pt. arrived with baby and was accompanied by  and Labor RN and arrived with personal belongings. Report was taken from Evelin Banegas in L&D.  Pt. is A&Ox3 and VSS on RA. Fundus is firm and midline.  Vaginal bleeding is scant.   Pt. c/o 4/10 pain. Pt. denied nausea, CP, SOB, lightheadedness, or dizziness. LS clear and BS hypoactive. PIV patent and infusing.  Camacho patent and draining.  Dressing to lower abdomen CDI.  Pneumoboots in place to BLE.  Pt. oriented to the room and call light system.

## 2020-09-29 NOTE — LACTATION NOTE
Routine Lactation visit to assist with feeding. Baby has attempted throughout the day, but has been too sleepy to wake for the last 3 feedings with RN assist. RNs have noted tight frenulum and tight upper lip frenulum. This LC assessed mouth with gloved finger when assisting with feeding and noted apparent high palate as well. Baby too sleepy to wake even with stimulation.    LC assisted with hand expression and set up Medela Symphony pump for Alena to use. Reviewed pump settings, suction, and initiate mode. Encouraged to pump and give any EBM obtained anytime baby is too sleepy to latch moving forward. Encouraged to have RN assistance with feedings to ensure a deep comfortable latch until baby is latching well.    Discussed using breast pump in preparation for return to work. Discussed milk storage, introducing a bottle, and general recommendation to wait to start pumping for milk storage or bottle feeding in preparation for return to work until breastfeeding is well established in 3-4 weeks. Also discussed HaaKaa use.    Polly Dumont, RN-C, IBCLC, MNN, PHN, BSN

## 2020-09-29 NOTE — PROGRESS NOTES
OB Progress Note  2020  8:48 PM    S:  Pt with comfortable with epidural.   Right side lying w/ peanut ball.       O:  /76   Pulse 99   Temp 97.5  F (36.4  C) (Temporal)   Resp 16   LMP 2019   EFM: baseline 145, accelerations present, early/late/variable decelerations, moderate variability; Category II.   [place]:  Ctx q2-03 min  SVE:  5-6/80%/-1, small caput  Membranes: clear fluid    Pitocin at 10 mU/min    A/P:  35 year old  @40w6d admitted with PROM  1.  Routine cares  2.  Augmentation of labor: s/p expectant management, PO cytotec x 2, IV pitocin started. IUPC placed to better assess ctx pattern.   3. ROM x 22 hrs: afebrile, VSS  4. FHR: Cat II. Continuous monitoring. Deep variables when on left side. Pt on right side.  Discussed FSE w/ pt. Pt would prefer holding off at this point b/c we are tracing baby well. Low threshold for placement. Dr. Chun aware.   5. Epidural  6. Reassess 2 hr or PRN  7.  Anticipate       Rosa Murdock CNM    Addendum 21:23: FSE placed after likely prolonged decel. Pitocin down to 5 mU/min

## 2020-09-29 NOTE — PROGRESS NOTES
[facility]    Obstetrics Post-Op / Progress Note    Assessment & Plan   Assessment:  -1 Day Post-Op  Procedure(s):   SECTION    Doing well.  Clean wound without signs of infection.  No immediate surgical complications identified.  No excessive bleeding  Pain well-controlled.    Plan:  Ambulation encouraged  Breast feeding strategies discussed  Hemoglobin in AM  Lactation consultation  Monitor wound for signs of infection  Pain control measures as needed  Reportable signs and symptoms dicussed with the patient  Uterine massage  Anticipate discharge in 3-4 days    Rosa Collette     Interval History   Doing well.  Pain is well-controlled with ibuprofen and tylenol.  No fevers.  No history of wound drainage, warmth or significant erythema.  Good appetite.  Denies chest pain, shortness of breath, nausea or vomiting.  Not yet ambulatory. Camacho still in place. Passing flatus.  Breastfeeding well with help of nipple shield. Saw lactation earlier this AM who think baby might have tongue tie.     Medications     dextrose 5% lactated ringers       - MEDICATION INSTRUCTIONS -       NO Rho (D) immune globulin (RhoGam) needed - mother Rh POSITIVE       - MEDICATION INSTRUCTIONS -       oxytocin in 0.9% NaCl 100 mL/hr (20 0314)     oxytocin in 0.9% NaCl         acetaminophen  975 mg Oral Q6H     escitalopram  10 mg Oral Daily     [START ON 2020] ibuprofen  800 mg Oral Q6H     [START ON 2020] influenza quadrivalent (PF) vacc  0.5 mL Intramuscular Prior to discharge     ketorolac  30 mg Intravenous Q6H     senna-docusate  1 tablet Oral BID    Or     senna-docusate  2 tablet Oral BID     sodium chloride (PF)  3 mL Intracatheter Q8H       Physical Exam   Temp: 98.3  F (36.8  C) Temp src: Oral BP: 110/73 Pulse: 58   Resp: 14 SpO2: 98 % O2 Device: None (Room air)    Vitals:    20 2200   Weight: 116.1 kg (256 lb)     Vital Signs with Ranges  Temp:  [97.4  F (36.3  C)-99  F (37.2  C)] 98.3   F (36.8  C)  Pulse:  [58-99] 58  Resp:  [12-20] 14  BP: (110-152)/(55-88) 110/73  SpO2:  [93 %-99 %] 98 %  I/O last 3 completed shifts:  In: 1440 [I.V.:1440]  Out: 1020 [Urine:900; Blood:120]    Uterine fundus is firm, non-tender and at the level of the umbilicus  Incision C/D/I    Data   Recent Labs   Lab Test 09/28/20 2148   ABO O   RH Pos   AS Neg     Recent Labs   Lab Test 09/28/20 2148   HGB 13.2     No lab results found.

## 2020-09-29 NOTE — ANESTHESIA POSTPROCEDURE EVALUATION
Patient: Alena Hall    * No procedures listed *    Diagnosis:* No pre-op diagnosis entered *  Diagnosis Additional Information: No value filed.    Anesthesia Type:  No value filed.    Note:  Anesthesia Post Evaluation       Anesthetic complications: None          Last vitals:  Vitals:    09/28/20 1835 09/28/20 1910 09/28/20 1920   BP:  132/63 127/76   Pulse:  93 99   Resp: 16 16 16   Temp: 36.4  C (97.5  F)           Electronically Signed By: Flora Aguero  September 28, 2020  10:02 PM

## 2020-09-29 NOTE — ANESTHESIA POSTPROCEDURE EVALUATION
Patient: Alena Hall    Procedure(s):   SECTION    Diagnosis:40 weeks gestation of pregnancy [Z3A.40]  Diagnosis Additional Information: No value filed.    Anesthesia Type:  Epidural    Note:  Anesthesia Post Evaluation    Patient location during evaluation: PACU  Patient participation: Able to fully participate in evaluation  Level of consciousness: awake  Pain management: adequate  Airway patency: patent  Cardiovascular status: acceptable  Respiratory status: acceptable  Hydration status: acceptable  PONV: none     Anesthetic complications: None          Last vitals:  Vitals:    20 0045 20 0100 20 0115   BP: 114/67 118/64 124/68   Pulse: 87 90 87   Resp: 13 12 13   Temp:      SpO2: 95% 95% 96%         Electronically Signed By: Flora Aguero  2020  1:31 AM

## 2020-09-30 PROCEDURE — 25000132 ZZH RX MED GY IP 250 OP 250 PS 637: Performed by: OBSTETRICS & GYNECOLOGY

## 2020-09-30 PROCEDURE — 12000035 ZZH R&B POSTPARTUM

## 2020-09-30 PROCEDURE — 90686 IIV4 VACC NO PRSV 0.5 ML IM: CPT | Performed by: MIDWIFE

## 2020-09-30 PROCEDURE — 25000128 H RX IP 250 OP 636: Performed by: MIDWIFE

## 2020-09-30 RX ADMIN — ESCITALOPRAM OXALATE 10 MG: 10 TABLET ORAL at 08:00

## 2020-09-30 RX ADMIN — ACETAMINOPHEN 975 MG: 325 TABLET, FILM COATED ORAL at 13:45

## 2020-09-30 RX ADMIN — IBUPROFEN 800 MG: 400 TABLET ORAL at 00:51

## 2020-09-30 RX ADMIN — DOCUSATE SODIUM 50 MG AND SENNOSIDES 8.6 MG 1 TABLET: 8.6; 5 TABLET, FILM COATED ORAL at 22:00

## 2020-09-30 RX ADMIN — DOCUSATE SODIUM 50 MG AND SENNOSIDES 8.6 MG 1 TABLET: 8.6; 5 TABLET, FILM COATED ORAL at 07:23

## 2020-09-30 RX ADMIN — IBUPROFEN 800 MG: 400 TABLET ORAL at 13:45

## 2020-09-30 RX ADMIN — OXYCODONE HYDROCHLORIDE 5 MG: 5 TABLET ORAL at 19:46

## 2020-09-30 RX ADMIN — OXYCODONE HYDROCHLORIDE 5 MG: 5 TABLET ORAL at 04:51

## 2020-09-30 RX ADMIN — OXYCODONE HYDROCHLORIDE 5 MG: 5 TABLET ORAL at 00:52

## 2020-09-30 RX ADMIN — ACETAMINOPHEN 975 MG: 325 TABLET, FILM COATED ORAL at 19:47

## 2020-09-30 RX ADMIN — INFLUENZA A VIRUS A/GUANGDONG-MAONAN/SWL1536/2019 CNIC-1909 (H1N1) ANTIGEN (FORMALDEHYDE INACTIVATED), INFLUENZA A VIRUS A/HONG KONG/2671/2019 (H3N2) ANTIGEN (FORMALDEHYDE INACTIVATED), INFLUENZA B VIRUS B/PHUKET/3073/2013 ANTIGEN (FORMALDEHYDE INACTIVATED), AND INFLUENZA B VIRUS B/WASHINGTON/02/2019 ANTIGEN (FORMALDEHYDE INACTIVATED) 0.5 ML: 15; 15; 15; 15 INJECTION, SUSPENSION INTRAMUSCULAR at 13:44

## 2020-09-30 RX ADMIN — IBUPROFEN 800 MG: 400 TABLET ORAL at 07:22

## 2020-09-30 RX ADMIN — ACETAMINOPHEN 975 MG: 325 TABLET, FILM COATED ORAL at 08:00

## 2020-09-30 RX ADMIN — OXYCODONE HYDROCHLORIDE 5 MG: 5 TABLET ORAL at 13:45

## 2020-09-30 RX ADMIN — IBUPROFEN 800 MG: 400 TABLET ORAL at 19:47

## 2020-09-30 RX ADMIN — ACETAMINOPHEN 975 MG: 325 TABLET, FILM COATED ORAL at 01:35

## 2020-09-30 RX ADMIN — OXYCODONE HYDROCHLORIDE 5 MG: 5 TABLET ORAL at 09:21

## 2020-09-30 NOTE — PLAN OF CARE
Vital signs stable. Postpartum assessment WDL. Incision CDI. Pain controlled with tylenol, ibuprofen and oxycodone. Patient ambulating independently, voiding, tolerating regular diet. Breastfeeding on cue with some assist, and nipple shield. Patient reported that she pumped during the day, did not observe overnight. Patient and infant bonding well. Will continue with current plan of care.

## 2020-09-30 NOTE — PROGRESS NOTES
OB Post-op  Section Progress Note POD# 2    S:  Patient doing well.  Pain well controlled with oral pain medication.  Ambulating.  Tolerating reg diet.  No N/V.  Passing flatus.  Voiding.  Bleeding WNL per pt report.  Breastfeeding is going well.     O:  /61   Pulse 61   Temp 98.3  F (36.8  C) (Oral)   Resp 16   Wt 116.1 kg (256 lb)   LMP 2019   SpO2 99%   Gen- A&O, NAD  Abd- soft, non-tender, +BS, no rebound or guarding, fundus firm at umbilicus  Incision- C/D/I  Ext- non-tender, trace edema.    Hemoglobin   Date Value Ref Range Status   2020 11.8 11.7 - 15.7 g/dL Final         A/P:  35 year old  POD# 2s/p primary LTCS for fetal distress    1.  Routine post-op cares  2.  Anticipate d/c home on POD#3    SIRISHA Perez CNM  2020  8:06 AM

## 2020-09-30 NOTE — LACTATION NOTE
".Alena concerned about breast feeding and baby \"did not really feed at the 1130AM feed\"  It has been 4 hours since the last fair feeding.  Baby will not awaken and and latch .  Alena talk to LC about bottle feeding Similac.   LC brought in SNS and Placed baby on the right breast with shield and took 14ml.  Pumping after feeds and then will give EBM.  No further questions at this time. Will follow as needed. Britney Castro BSN, RN, PHN, RNC-MNN, IBCLC   "

## 2020-09-30 NOTE — PLAN OF CARE
Vital signs stable. Up ad roxana. Voiding without difficulties. Bleeding wnl. Incision dressing CDI. Using an abdominal binder. Taking tylenol, ibuprofen and oxycodone for pain control. Breast feeding improving with nipple shield. Pumping after sleepier feeds.  here and supportive. Flu shot given. Encouraged to call with any questions or concerns. Will continue to monitor.

## 2020-09-30 NOTE — LACTATION NOTE
Routine visit with Alena, FOB and baby.    Breastfeeding general information reviewed.   Advised to breastfeed on demand 8-12x/day or sooner if baby cues. Baby breastfeeding well with the shield, pumping  After feeds.  Yielding gtts.   Instructed on signs/symptoms of engorgement/ plugged ducts and mastitis.  Instructed on comfort measures and when to call MD.  Has a breast pump for home and Outpatient resources reviewed.    Continues to nurse well per mom. No further questions at this time.   Will follow as needed.   Britney Castro BSN, RN, PHN, RNC-MNN, IBCLC

## 2020-09-30 NOTE — PLAN OF CARE
VSS, breastfeeding with shield and pumping intermittently.  Fundus is firm at U/U, scant flow, no clots.  Incision dressing is CDI, no drainage.  Pain controlled with Tylenol, Ibuprofen and Oxy.  Independent with cares.  SO at bedside and supportive.  Will continue to monitor and support.

## 2020-09-30 NOTE — LACTATION NOTE
"Routine visit with Alena, FOB and infant. Infant sleepy at this time. Skin to skin with Alena. Cluster fed from [id number]. Using nipple shield and doing well. Patient planning to take infant to pediatric dentist to assess tongue tie and lip tie for release. Alena pumped X1 today and will pump if infant misses a feeding tonight. Discussed cluster feeding and what to expect with that feeding pattern.    Breastfeeding general information reviewed. Advised to breastfeed exclusively, on demand, avoid pacifiers, bottles and formula unless medically indicated.    Encouraged rooming in, skin to skin, feeding on demand 8-12x/day or sooner if baby cues.  Explained benefits of holding and skin to skin. Discussed typical feeding pattern for the next 24-48 hours.     Discussed typical onset of mature milk. Instructed on hand expression and when to start pumping and introducing a bottle if needed when returning to work.     Breastfeeding going well per mother. Pt has pump for use at home. Encouraged to read \"A New Beginning\". Patient thankful for  support and advice.    All questions answered. No further questions at this time. Will follow as needed.     TOVA Heller RN, BSN, PHN, IBCLC    "

## 2020-10-01 PROCEDURE — 120N000012 HC R&B POSTPARTUM

## 2020-10-01 PROCEDURE — 250N000013 HC RX MED GY IP 250 OP 250 PS 637: Performed by: OBSTETRICS & GYNECOLOGY

## 2020-10-01 RX ADMIN — OXYCODONE HYDROCHLORIDE 5 MG: 5 TABLET ORAL at 03:53

## 2020-10-01 RX ADMIN — ACETAMINOPHEN 975 MG: 325 TABLET, FILM COATED ORAL at 14:05

## 2020-10-01 RX ADMIN — OXYCODONE HYDROCHLORIDE 5 MG: 5 TABLET ORAL at 11:28

## 2020-10-01 RX ADMIN — OXYCODONE HYDROCHLORIDE 5 MG: 5 TABLET ORAL at 07:31

## 2020-10-01 RX ADMIN — DOCUSATE SODIUM 50 MG AND SENNOSIDES 8.6 MG 1 TABLET: 8.6; 5 TABLET, FILM COATED ORAL at 20:08

## 2020-10-01 RX ADMIN — IBUPROFEN 800 MG: 400 TABLET ORAL at 14:05

## 2020-10-01 RX ADMIN — DOCUSATE SODIUM 50 MG AND SENNOSIDES 8.6 MG 1 TABLET: 8.6; 5 TABLET, FILM COATED ORAL at 07:32

## 2020-10-01 RX ADMIN — ACETAMINOPHEN 975 MG: 325 TABLET, FILM COATED ORAL at 07:31

## 2020-10-01 RX ADMIN — OXYCODONE HYDROCHLORIDE 5 MG: 5 TABLET ORAL at 15:17

## 2020-10-01 RX ADMIN — IBUPROFEN 800 MG: 400 TABLET ORAL at 02:00

## 2020-10-01 RX ADMIN — IBUPROFEN 800 MG: 400 TABLET ORAL at 07:31

## 2020-10-01 RX ADMIN — OXYCODONE HYDROCHLORIDE 5 MG: 5 TABLET ORAL at 20:07

## 2020-10-01 RX ADMIN — ACETAMINOPHEN 975 MG: 325 TABLET, FILM COATED ORAL at 20:08

## 2020-10-01 RX ADMIN — ACETAMINOPHEN 975 MG: 325 TABLET, FILM COATED ORAL at 02:00

## 2020-10-01 RX ADMIN — IBUPROFEN 800 MG: 400 TABLET ORAL at 20:07

## 2020-10-01 RX ADMIN — ESCITALOPRAM OXALATE 10 MG: 10 TABLET ORAL at 07:31

## 2020-10-01 NOTE — PROGRESS NOTES
"OB CS post op note  POD# 3    S:  Patient very tearful and states she is \"exhausted\", somewhat disappointed about having a c/s and baby is having trouble breastfeeding.  Pain controlled with oxy, ibu and tylenol.  Dheeraj reg diet,  Voiding, independently ambulating,  Bleeding scant.     O:  /70 (BP Location: Right arm)   Pulse 65   Temp 98.5  F (36.9  C) (Oral)   Resp 16   Wt 116.1 kg (256 lb)   LMP 2019   SpO2 99%   Breastfeeding Unknown   No intake or output data in the 24 hours ending 10/01/20 08   Gen- A&O, NAD  PULM: CTAB  CV: RRR  Abd- Non-tender, fundus firm at umbilicus  Incision: C/D/I  Ext- non-tender, + 1 pedal edema    Hemoglobin   Date Value Ref Range Status   2020 11.8 11.7 - 15.7 g/dL Final     O pos  Rubella Immune    A/P: 35 year old  POD# 3 s/p primary LTCS for fetal intolerance to labor, anxiety    1.  Routine post-partum cares.  2.  Anticipate discharge home tomorrow  3.  Baby has OT consult today, lactation support as needed    Loida Guajardo CNM  10/1/2020  8:27 AM    "

## 2020-10-01 NOTE — LACTATION NOTE
Routine visit. Attempted to round on patient at this time. Sleeping. Will continue to follow as needed.  TOVA Heller RN, BSN, PHN, IBCLC

## 2020-10-01 NOTE — LACTATION NOTE
Routine visit with Alena and infant. Infant awake for feeding. Infant placed skin to skin with Alena in football hold on left breast. SNS with 10mls Similac placed at breast with nipple shield. LC assisting with feed. Infant needing encouragement to start suckling. Making minimal effort at times. Able to take full supplement. Alena is pumping with feeds when infant is sleepy. Will continue to supplement at breast to keep infant interested in breastfeeding. Will continue to follow as needed.  TOVA Heller RN, BSN, PHN, IBCLC

## 2020-10-01 NOTE — LACTATION NOTE
Routine visit with Alena and infant. Infant awake and ready for feeding at this time. Alena states they have used similac with a tube at the breast X2. Infant not interested in feeding at this time with shield. Alena plans to pump and use EBM to attempt getting infant feeding again in an hour. Will call LC to come assist when ready.  TOVA Heller RN, BSN, PHN, IBCLC

## 2020-10-01 NOTE — PLAN OF CARE
Fundus firm and bleeding wnl.  VSS.  Aquacel dressing clean, dry and intact.  Rates pain 5/10 and taking scheduled tylenol, ibuprofen and oxycodone as needed with good relief.  Up independently.  Using abdominal binder.  Passing flatus and tolerating regular diet.  Encouraged to call with questions or concerns.

## 2020-10-01 NOTE — LACTATION NOTE
LC into room to assist with bottle feeding. Infant requiring quite a bit of encouragement and chin support. Not maintaining a latch on the bottle. Plan to put in OT consult to assist. Mother unable to get infant to bottle feed independently. Will continue to follow.  TOVA Heller RN, BSN, PHN, IBCLC

## 2020-10-01 NOTE — PLAN OF CARE
The pt continues working on breastfeeding with the shield, she's independent with positioning, and latch verified.  She's supplementing at the breast with Similac via SNS and she started pumping (as her son was very sleepy at the breast).  Lactation following, will continue to assist.  The pt reports pain adequately-controlled and she's ambulating independently in the room

## 2020-10-02 VITALS
RESPIRATION RATE: 16 BRPM | HEART RATE: 59 BPM | TEMPERATURE: 97.6 F | DIASTOLIC BLOOD PRESSURE: 79 MMHG | WEIGHT: 256 LBS | SYSTOLIC BLOOD PRESSURE: 126 MMHG | OXYGEN SATURATION: 99 %

## 2020-10-02 PROCEDURE — 250N000013 HC RX MED GY IP 250 OP 250 PS 637: Performed by: OBSTETRICS & GYNECOLOGY

## 2020-10-02 RX ORDER — OXYCODONE HYDROCHLORIDE 5 MG/1
5 TABLET ORAL EVERY 4 HOURS PRN
Qty: 20 TABLET | Refills: 0 | Status: ON HOLD | OUTPATIENT
Start: 2020-10-02 | End: 2023-01-19

## 2020-10-02 RX ORDER — IBUPROFEN 200 MG
800 TABLET ORAL EVERY 6 HOURS
COMMUNITY
Start: 2020-10-02

## 2020-10-02 RX ORDER — ACETAMINOPHEN 325 MG/1
975 TABLET ORAL EVERY 6 HOURS
COMMUNITY
Start: 2020-10-02

## 2020-10-02 RX ADMIN — IBUPROFEN 800 MG: 400 TABLET ORAL at 02:44

## 2020-10-02 RX ADMIN — OXYCODONE HYDROCHLORIDE 5 MG: 5 TABLET ORAL at 05:42

## 2020-10-02 RX ADMIN — IBUPROFEN 800 MG: 400 TABLET ORAL at 08:15

## 2020-10-02 RX ADMIN — OXYCODONE HYDROCHLORIDE 5 MG: 5 TABLET ORAL at 00:12

## 2020-10-02 RX ADMIN — ESCITALOPRAM OXALATE 10 MG: 10 TABLET ORAL at 08:15

## 2020-10-02 RX ADMIN — DOCUSATE SODIUM 50 MG AND SENNOSIDES 8.6 MG 1 TABLET: 8.6; 5 TABLET, FILM COATED ORAL at 08:16

## 2020-10-02 RX ADMIN — ACETAMINOPHEN 975 MG: 325 TABLET, FILM COATED ORAL at 08:15

## 2020-10-02 RX ADMIN — ACETAMINOPHEN 975 MG: 325 TABLET, FILM COATED ORAL at 02:44

## 2020-10-02 RX ADMIN — OXYCODONE HYDROCHLORIDE 5 MG: 5 TABLET ORAL at 11:49

## 2020-10-02 NOTE — LACTATION NOTE
Primary RN is part of our lactation team and had worked a lot with this family today. Alena has anxiety and is now feeling like they have a solid feeding plan established. Infant has a  tongue tie and is having a frenulectomy early next week. So Alena is working on breast feeding with a shield, pumping and bottling EBM and supplementing with formula if necessary.    Alena shares she feels very supported by Primary RN (Amee Briggs RN)  and doesn't have any further questions for LC at this time.    LC offered to come back if any questions arise prior to discharge. Will follow if requested.    Faustina Salgado, RN  Lactation Educator

## 2020-10-02 NOTE — PLAN OF CARE
Alena tolerating activity, pain controlled with tylenol, ibuprofen and occasional oxycodone. + BM. Ambulating in room. Incision reddened, no drainage, staples intact. Anticipate discharge today. Working on breastfeeding; infant has not been latching well d/t tight frenulum. Assisted with latch on right breast; infant able to latch fair with nipple shield in football hold. Discussed importance of pumping 8x/day to establish milk supply. Encouraged family to follow up with lactation outpatient after procedure.

## 2020-10-02 NOTE — PLAN OF CARE
Vitals within defined limits. Fundus firm. Lochia scant, no clots noted.  incision clean, dry, well-approximated. Using Tylenol/ibuprofen/oxycodone for incisional soreness with good relief. Lung sounds clear. Edema +1 to lower extremities. Working on pumping and supplementing overnight. Encouraged to call for latch checks. Support person at bedside and supportive.

## 2020-10-02 NOTE — PROGRESS NOTES
OB Post-op  Section Progress Note POD# 4    S:  Patient doing well.  Pain well controlled with oral pain medication.  Ambulating.  Tolerating reg diet.  No N/V.  Passing flatus.  Voiding.  Bleeding WNL.  Breastfeeding well.     O:  /68   Pulse 64   Temp 97.7  F (36.5  C) (Oral)   Resp 16   Wt 116.1 kg (256 lb)   LMP 2019   SpO2 99%       Gen- A&O, NAD  Abd- soft, non-tender, +BS, no rebound or guarding, fundus firm at umbilicus  Incision- C/D/I, there is some bruising into the mons area and over part of incision, but no signs of infection. Incision is healing well.   Ext- non-tender, +1 edema.     Hemoglobin   Date Value Ref Range Status   2020 11.8 11.7 - 15.7 g/dL Final         A/P:  35 year old  POD# 4 s/p primary LTCS for fetal distress    1.  Routine post-op cares  2.  Warning signs reviewed. Discharge to home today. Follow up for incision check next week.     SIRISHA Perez CNM  10/2/2020  8:08 AM

## 2020-10-02 NOTE — PLAN OF CARE
D: VSS, assessments WDL.   I: Pt. received complete discharge paperwork and home medications as filled by discharge pharmacy.  Pt. was given times of last dose for all discharge medications in writing on discharge medication sheets.  Discharge teaching included home medication, pain management, activity restrictions, postpartum cares, and signs and symptoms of infection. Staples removed, steri strips placed.  A: Discharge outcomes on care plan met.  Mother states understanding and comfort with self care and follow up care.   P: Pt. Discharged.  Pt. was accompanied by SO and left with personal belongings.  Home care referral sent.  Pt. to follow up with OB provider per discharge instructions.  Pt. had no further questions at the time of discharge and no unmet needs were identified.

## 2020-10-05 NOTE — OP NOTE
Procedure Date: 2020      ATTENDING PHYSICIAN:  Chana Chun MD      PREOPERATIVE DIAGNOSES:   1.  Intrauterine pregnancy at 40 plus 6 weeks.   2.  Fetal intolerance of labor.      POSTOPERATIVE DIAGNOSES:   1.  Intrauterine pregnancy at 40 plus 6 weeks.   2.  Fetal intolerance of labor.      PROCEDURE:  Primary low segment transverse  section.      QUANTITATIVE BLOOD LOSS: 120 mL.      ESTIMATED BLOOD LOSS:  400 mL.      ANESTHESIA:  Epidural.      FINDINGS:  Male infant in the OP presentation, 7 pounds 9 ounces with Apgars of 8 at 1 minute, 9 at 5 minutes.  Thick meconium-stained fluid.  Placenta grossly normal.      COMPLICATIONS:  None.      PROCEDURE IN DETAIL:  The patient was taken to the operating room after being consented for primary low segment transverse  section due to fetal intolerance of labor.  The patient had an epidural in place and this was re-bolused for adequate pain medication.  She was then placed into supine position with a leftward tilt, prepped and draped in normal sterile fashion.  Timeout was performed.  Camacho catheter was in place and draining clear urine.      At this time, a Pfannenstiel skin incision was made 2 cm above the symphysis pubis and carried down through a thick subcutaneous layer approximately 3-4 cm in thickness down to the fascia.  Fascia was incised in the midline with scalpel, and this incision was extended bilaterally using Elizalde scissors.  Kocher clamps were placed on the superior aspect of the fascia, which was elevated, and rectus muscles were dissected off sharply.  Kocher clamps were placed on the inferior aspect of the fascia, which was elevated, and rectus muscles were dissected off bluntly.  Rectus muscles were then  bluntly in the midline.  Peritoneum was identified superiorly.  There was noted to be a thick preperitoneal fat pad.  Peritoneum was then entered bluntly and the peritoneal incision was extended superiorly and  transversely with excellent visualization of the bladder.      At this time, a large Deng retractor was placed.      Lower uterine segment was identified and noted to be well developed.  A scalpel was used to incise the lower uterine segment down to the uterine cavity where there was thick meconium fluid noted.      At this time, uterine incision was extended with bandage scissors bilaterally.  Fetal vertex was then elevated out of the pelvis, delivered through the uterine incision.  Remainder of the infant was delivered without complication.  Delayed cord clamping was done.      At this time, NICU team assessed the infant and cord gases were sent.      At this time, placenta was removed with vigorous fundal massage and traction.  A moist laparotomy sponge was used to clear the uterine cavity of all clot and debris.  At this time, uterine incision was examined closely.  There were no extensions noted.  The uterine incision was then closed using 0 Vicryl in a running, locked fashion with a second layer of 0 Monocryl in an imbricating fashion.      At this time, copious irrigation was performed.  The uterine incision was hemostatic.      At this time, Deng retractor was removed.  Vesicouterine peritoneum and the bladder were noted to be well away from the uterine incision.      At this time, the peritoneum was then identified with Karen and closed using 2-0 Vicryl in a running fashion.  Rectus muscles were examined closely.  Hemostasis was assured using cautery.  Seprafilm was then placed over the peritoneal incision and rectus muscles.  At this time, fascia was closed using 0 Vicryl in a running fashion.  Subcutaneous layer was copiously irrigated.  Hemostasis was obtained using cautery.      At this time, several interrupted 3-0 Vicryl sutures were placed to reapproximate the subcutaneous tissue, which was then closed using staples.      At the end of the procedure, sponge, lap, and needle counts were correct  x 2.  Estimated blood loss was 400 mL.  Qualitative blood loss was 120 mL.  The patient was taken to recovery in stable condition.         MARTIN COLLINS MD             D: 10/05/2020   T: 10/05/2020   MT: ZHANG      Name:     PIA THOMSON   MRN:      [id number]        Account:        [id number]   :      1985           Procedure Date: 2020      Document: [id number]

## 2020-11-23 NOTE — DISCHARGE SUMMARY
OB  Discharge Summary    DOA: 2020  DOD:  10/2/61864    Admission Diagnosis  1.  IUP @ 40w6d   2.  Active labor      Discharge Diagnosis  1.  IUP @ 40w6d  2. Cat II FHTs remote from delivery  3. POD 4 LST        HPI / Hospital Course:     Patient is a 35 year old  40w6d who presented to L&D at 40+6 wks with SROM AND LABOR.  Her prenatal course was unremarkable. She developed a cat II FHTs and arrest of dilitation.  was recommended for delivery.  Intra-operative course:  Patient was taken to the operating room for the above noted procedure. No complications.  For full details of intraoperative course, please see dictated operative note.      Post-operative course:   Patient's post-operative course was uncomplicated.  By post-operative day #3 she was ambulating, her pain was well-controlled on oral pain medications, her bleeding was minimal, she was tolerating PO, she was voiding and passing flatus.  Patient was deemed stable for discharge.  Her post-op Hgb was 11.8   Her vital signs remained stable  On the day of discharge, her wound was without evidence of infection.  Her staples were removed and steri-strips were applied.      Post-op instructions:  1.  Patient was instructed to RTC in 6 weeks for post-partum visit.  2.  Patient was instructed to call MD for temperature greater than 100.4, foul smelling vaginal discharge, bleeding >1pad per hour, severe pain not controlled by pain medications, severe HA, redness/drainage from incision, asymmetric LE edema or with other concerns.  3.  Post-partum mood symptoms discussed.  Pt will call with si/sx of depression.  4.  Patient instructed to avoid lifting >20# x 6 weeks, to avoid vigorous exercise x 6 weeks, to observe pelvic rest x 6 weeks (no tampons or IC) and to avoid driving while taking narcotics.  5.  Patient instructed to remove steri-strips after 1 week.       Post-op medications:  1.  Ibuprofen 600mg PO Q6hrs PRN pain  2.   Oxycodone 5mg PO Q4hrs PRN severe pain  3.  Colace or other stool softener as needed  4.  PNV  5.  Resume home medications    Chana Chun MD  11/23/2020  8:45 AM

## 2021-01-10 ENCOUNTER — HEALTH MAINTENANCE LETTER (OUTPATIENT)
Age: 36
End: 2021-01-10

## 2021-05-17 NOTE — PLAN OF CARE
Vital signs are stable. Up ad roxana. Voiding without difficulties. Bleeding wnl. Incision with staples in place. Small amount of bruising underneath incision. Taking tylenol, ibuprofen and oxycodone for pain control. Using an abdominal binder and ice pack for comfort. Bottle feeding formula and pumping every three hours--getting 6-7 ml. Tolerating a regular diet. Significant other here and supportive. Feeling much better about feeding plan today. Encouraged to call with any questions or concerns. Will continue to monitor.    Anesthesia Type: 1% lidocaine with epinephrine

## 2021-10-23 ENCOUNTER — HEALTH MAINTENANCE LETTER (OUTPATIENT)
Age: 36
End: 2021-10-23

## 2021-12-01 ENCOUNTER — LAB REQUISITION (OUTPATIENT)
Dept: LAB | Facility: CLINIC | Age: 36
End: 2021-12-01
Payer: COMMERCIAL

## 2021-12-01 PROCEDURE — 88305 TISSUE EXAM BY PATHOLOGIST: CPT | Mod: TC,ORL | Performed by: OBSTETRICS & GYNECOLOGY

## 2021-12-02 LAB
PATH REPORT.COMMENTS IMP SPEC: NORMAL
PATH REPORT.FINAL DX SPEC: NORMAL
PATH REPORT.GROSS SPEC: NORMAL
PATH REPORT.MICROSCOPIC SPEC OTHER STN: NORMAL
PATH REPORT.RELEVANT HX SPEC: NORMAL
PHOTO IMAGE: NORMAL

## 2021-12-02 PROCEDURE — 88305 TISSUE EXAM BY PATHOLOGIST: CPT | Mod: 26

## 2021-12-07 PROCEDURE — 88182 CELL MARKER STUDY: CPT | Mod: ORL

## 2021-12-20 NOTE — PLAN OF CARE
Vital signs stable. Up ad roxana. Voiding without difficulties. Bleeding wnl. Showered, incision with staples in place. Small amount of bruising underneath incision. Taking tylenol, ibuprofen and oxycodone for pain control. Using an abdominal binder and ice pack for comfort. Bottle feeding formula and pumping every three hours--getting a few ml's. Tolerating a regular diet. Significant other here and supportive. Feeling much better about feeding plan today. Encouraged to call with any questions or concerns. Will continue to monitor.    [employer] dose 1, 2, and 3

## 2022-02-12 ENCOUNTER — HEALTH MAINTENANCE LETTER (OUTPATIENT)
Age: 37
End: 2022-02-12

## 2022-09-08 ENCOUNTER — TRANSFERRED RECORDS (OUTPATIENT)
Dept: HEALTH INFORMATION MANAGEMENT | Facility: CLINIC | Age: 37
End: 2022-09-08

## 2022-09-08 PROCEDURE — 88305 TISSUE EXAM BY PATHOLOGIST: CPT | Mod: TC,ORL | Performed by: OBSTETRICS & GYNECOLOGY

## 2022-09-08 PROCEDURE — 88305 TISSUE EXAM BY PATHOLOGIST: CPT | Mod: 26 | Performed by: PATHOLOGY

## 2022-09-09 ENCOUNTER — LAB REQUISITION (OUTPATIENT)
Dept: LAB | Facility: CLINIC | Age: 37
End: 2022-09-09
Payer: COMMERCIAL

## 2022-09-13 LAB
PATH REPORT.COMMENTS IMP SPEC: NORMAL
PATH REPORT.COMMENTS IMP SPEC: NORMAL
PATH REPORT.FINAL DX SPEC: NORMAL
PATH REPORT.GROSS SPEC: NORMAL
PATH REPORT.MICROSCOPIC SPEC OTHER STN: NORMAL
PATH REPORT.RELEVANT HX SPEC: NORMAL
PHOTO IMAGE: NORMAL

## 2022-09-21 ENCOUNTER — LAB REQUISITION (OUTPATIENT)
Dept: LAB | Facility: CLINIC | Age: 37
End: 2022-09-21
Payer: COMMERCIAL

## 2022-09-21 DIAGNOSIS — Z09 ENCOUNTER FOR FOLLOW-UP EXAMINATION AFTER COMPLETED TREATMENT FOR CONDITIONS OTHER THAN MALIGNANT NEOPLASM: ICD-10-CM

## 2022-09-21 LAB — HCG INTACT+B SERPL-ACNC: 33 MIU/ML

## 2022-09-21 PROCEDURE — 84702 CHORIONIC GONADOTROPIN TEST: CPT | Mod: ORL | Performed by: OBSTETRICS & GYNECOLOGY

## 2022-09-23 ENCOUNTER — MEDICAL CORRESPONDENCE (OUTPATIENT)
Dept: HEALTH INFORMATION MANAGEMENT | Facility: CLINIC | Age: 37
End: 2022-09-23

## 2022-09-23 ENCOUNTER — TRANSCRIBE ORDERS (OUTPATIENT)
Dept: MATERNAL FETAL MEDICINE | Facility: HOSPITAL | Age: 37
End: 2022-09-23

## 2022-09-23 DIAGNOSIS — Z31.69 ENCOUNTER FOR PRECONCEPTION CONSULTATION: Primary | ICD-10-CM

## 2022-09-26 DIAGNOSIS — Z31.69 ENCOUNTER FOR PRECONCEPTION CONSULTATION: Primary | ICD-10-CM

## 2022-09-27 ENCOUNTER — TRANSCRIBE ORDERS (OUTPATIENT)
Dept: MATERNAL FETAL MEDICINE | Facility: CLINIC | Age: 37
End: 2022-09-27

## 2022-09-27 DIAGNOSIS — Z31.69 ENCOUNTER FOR PRECONCEPTION CONSULTATION: Primary | ICD-10-CM

## 2022-10-09 ENCOUNTER — HEALTH MAINTENANCE LETTER (OUTPATIENT)
Age: 37
End: 2022-10-09

## 2022-10-24 ENCOUNTER — OFFICE VISIT (OUTPATIENT)
Dept: MATERNAL FETAL MEDICINE | Facility: CLINIC | Age: 37
End: 2022-10-24
Attending: OBSTETRICS & GYNECOLOGY
Payer: COMMERCIAL

## 2022-10-24 DIAGNOSIS — Z31.69 ENCOUNTER FOR PRECONCEPTION CONSULTATION: ICD-10-CM

## 2022-10-24 DIAGNOSIS — Z82.79 FAMILY HISTORY OF CHROMOSOMAL ABNORMALITY: Primary | ICD-10-CM

## 2022-10-24 PROCEDURE — 999N000069 HC STATISTIC GENETIC COUNSELING, < 16 MIN

## 2022-10-24 NOTE — PROGRESS NOTES
[facility]  Genetic Counseling Consult    Patient: Alena Hall YOB: 1985   Date of Service: 10/24/22      Alena Hall was seen at [facility] [place] for genetic consultation to discuss the options for screening and testing for fetal chromosome abnormalities.  The indication for genetic counseling is history of miscarriage, including one affected by a chromosomal abnormality, and advanced maternal age. She was unaccompanied to her appointment.       Impression/Plan:   1.  Alena had a genetic counseling session only.  She was seen for a history of recurrent miscarriages. She has had two miscarriages, with her most recent being a fetus with [employer] products of conception testing showing trisomy 10. This was in 2022.    2.  We discussed parental chromosome studies and carrier screening. The patient declined both options at this time. However, she was provided CPT codes for parental chromosome studies, and will consider after checking with insurance regarding cost.    Pregnancy History:   /Parity:    Alena glynn pregnancy history is significant for:    A son named Lennox who was born in     Two miscarriages, see section on recurrent pregnancy loss    Recurrent Pregnancy Loss   Recurrent miscarriage is defined as three or more clinically recognized consecutive or non-consecutive pregnancy losses occurring prior to fetal viability (<24 weeks). Alena's personal history is significant for two pregnancy losses at six weeks and seven weeks & five days. Alena reports that her first miscarriage was in . She went in at 10 weeks, but reportedly ultrasound showed the loss was at 6 weeks. She said there was not enough tissue for products of conception analysis. Alena reports that her second miscarriage was in . She said the loss occurred at ~7w5d and required a D&C at 8  1/2w. Anora products of conception testing showed that this fetus was affected by trisomy 10.    While she does not meet typical criteria for recurrent pregnancy loss, based on the health provider s professional judgment, evaluation of couples with two miscarriages may be initiated when deemed necessary especially when taking into account the woman s age and length of time that the couple spent trying to have a successful pregnancy.    We reviewed that at least 10-15% of the recognized pregnancies end in miscarriage, with most losses occurring in the first trimester. The rate of miscarriage less than 8 weeks gestation may be even greater as some women may not recognize that they are pregnant. Around half of miscarriages that occur before 20 weeks gestation are caused by chromosome abnormalities. The risk for a miscarriage increases with advancing maternal age due to a higher incidence of conceptuses with a chromosomal aneuploidy. The risk may approach 75% in women who are 45 years of age and older. In about 50% of couples with recurrent pregnancy loss, the etiology remains unknown despite a thorough evaluation and is therefore classified as idiopathic. It is estimated that couples with idiopathic recurrent pregnacy loss can have up to a 75% chance of having a successful pregnancy.    Commonly reported causes of recurrent pregnancy loss include the following:    Endocrine:    Uncontrolled diabetes mellitus     Luteal phase deficiency (remains inconclusive, limited to 1st trimester)    Polycystic ovary syndrome    Environmental agents:     Prolonged exposure to alcohol, smoking, cocaine     Immunologic    Antiphospholipid syndrome    Maternal factors (acquired, inherited, structural):    Uterine anatomic malformations    Myomas    Cervical abnormalities    Chromosomal and single gene disorders:    Fetal chromosomal abnormalities    Parental balanced translocation    Alpha thalassemia major    Thrombophilia    X-linked  male lethal conditions    Familial chromosome rearrangements can lead to a history of recurrent pregnancy loss as well as increased the potential risk for a stillbirth or live born babies with birth defects and/or intellectual disabilities. In this case a parent would carry a balanced rearrangement with an equal exchange of chromosome material. For example, a portion of chromosome 6 could be attached to chromosome 12 and a portion of 12 attached to 6. An individual with a balanced translocation is typically healthy. However, a child of this individual may inherit one of the rearranged chromosomes, along with normal chromosomes, results in an unbalanced rearrangement with total loss and gain of chromosome material. In the above example, a child could have a loss of chromosome 6 and a gain of chromosome 12. If a conceptus has unbalanced chromosome material it could lead to miscarriages or an affected children. This result can be dependent on the amount of material that is gained or loss and what chromosome the material is from. If a chromosome translocation is inherited through a family there are typically multiple individuals over multiple generations with recurrent pregnancy loss and/or affected individuals. However, it is also possible for a de hiren translocation, with no family history to occur. For couples who have experienced three or more unexplained pregnancy losses, it has been estimated that around 2-5% of these couples have this history related to a chromosome in one of the partners. Chromosome analysis on parents is possible by obtaining a karyotype on peripheral blood in which the chromosomes are sorted and counted in the laboratory. This result can take up to 1-2 months to return and insurance coverage is variable. CPT codes were provided for Alena so that she can consider this testing further.   Medical History:   Alena reports a history of anxiety and ADD for which she takes medication. We  "discussed how mental illnesses are thought to be inherited in a multifactorial fashion, meaning many factors are involved in the development of these conditions. These factors usually include both genetic and environmental aspects and a combination of these aspects lead to the condition. Given that there is a genetic component, the couple's children may be at increased risk to develop a mental illness and were encouraged to share this information with their pediatrician and have awareness for early signs and symptoms. Alena s reported medical history is not expected to impact pregnancy management or risks to fetal development.       Family History:   A three-generation pedigree was obtained, and is scanned under the  Media  tab.   The following significant findings were reported by Alena:    Alena and her partner, Fracisco, have a two-year-old son together who is healthy.    Alena has three brothers (34y, 32y, 29y) who are reportedly healthy.    Alena's nephew (4y) from her middle brother had a brain bleed for which he required shunts.    Alena's father passed away from a pulmonary embolism at 56. She has seen a genetic counselor in hematology regarding this history. See note from 01-. Reportedly, findings were negative except for being homozygous for a MTHFR variant. We reviewed that these two polymorphic variants in the MTHFR gene are extremely common in the general population. The association for MTHFR genotype status and medical health conditions and complications has been studied over many large studies. Modest associations have been found between the common variant and common conditions; however, more recent studies have suggested a new assessment of these associations. The American College of Medical [employer]etics and Gen[employer] (ACMG) released a practice guideline in 2013 and recommended that \"MTHFR polymorphism genotyping should not be ordered as part of the clinical evaluation for thrombophilia or " "recurrent pregnancy loss\". Per laboratory interpretation, this result is not associated with a significantly increased risk for coronary artery disease, venous thromboembolism, or adverse pregnancy outcome.     Alena's partner, Fracisco (41y) reportedly has a history of anxiety.    Fracisco has twins, a daughter and son (11y), who are reportedly healthy.    Fracisco's mother (66y) has a history of lupus and autoimmune concerns. We reviewed the family history of lupus. This condition, and other autoimmune disorders, are multifactorial in nature, resulting from both genetic and non-genetic factors. Once an autoimmune disease is present in a family, other relatives may be at increased risk to develop the same disease or a different autoimmune disease.  Presymptomatic and prenatal testing are not available for autoimmune disorders.  Fracisco's father (67y) has a history of prostate cancer diagnosed in his fifties. Alena is unaware if it was high-grade or metastatic. We discussed the family history of  cancer briefly. Cancer most often occurs by chance, however some families seem to develop cancer more frequently than expected. Everyone has a risk to develop cancer, but individuals may be at an increased risk to develop cancer based on their family history. In the absence of additional family history, it is unlikely that this cancer is hereditary. However, if they learn more information about other affected relatives, or the cancer being high grade/metastatic, they should update genetic counseling. If the family wants more information they can contact the [facility] ([phone]).    Alena reports that there is concern that Fracisco's sister's son (3y) has autism. Some forms of autism spectrum disorders can be associated with specific genetic conditions, where approximately 15-20% of individuals who have autism will have an identifiable genetic cause for this history.  However, most often these " conditions are due to the combination of genes and environmental factors that can affect development.  Since there is a genetic component to autism spectrum disorders, the recurrence risk for other family members is higher among first-degree relatives of the individual with an autism spectrum disorder (full siblings), and decreases with distance in relationship to other second-degree relatives.    Otherwise, the reported family history is negative for multiple miscarriages, stillbirths, birth defects, intellectual disability, cancer <50y, known genetic conditions, and consanguinity.       Carrier Screening:   The patient reports that the father of the pregnancy has  ancestry:     Cystic fibrosis is an autosomal recessive genetic condition that occurs with increased frequency in individuals of  ancestry and carrier screening for this condition is available.  In addition,  screening in the [facility] includes cystic fibrosis.    Expanded carrier screening for mutations in a large panel of genes associated with autosomal recessive conditions including cystic fibrosis, spinal muscular atrophy, and others, is now available.    The patient has declined the carrier screening options reviewed today.       Risk Assessment for Chromosome Conditions:   We explained that the risk for fetal chromosome abnormalities increases with maternal age. We discussed specific features of common chromosome abnormalities, including Down syndrome, trisomy 13, trisomy 18, and sex chromosome trisomies.      - Assuming age 37 at delivery, the first trimester risk to have a baby with Down syndrome is 1 in 132.     - Assuming age 38 at delivery, the first trimester risk to have a baby with any chromosome abnormality is 1 in 66.     After having a nonviable trisomy in a spontaneous  the risk of having a viable trisomy increases by 1.8 (Vernell, 2004), putting Alena at a 1 in 37, or ~3% risk of having a  pregnancy affected by a viable trisomy.         Testing Options:   We discussed the following options should Alena conceive a pregnancy:   Non-invasive Prenatal Testing (NIPT)    Maternal plasma cell-free DNA testing; first trimester ultrasound with nuchal translucency and nasal bone assessment is recommended, when appropriate    Screens for fetal trisomy 21, trisomy 13, trisomy 18, and sex chromosome aneuploidy (SCA)    Cannot screen for open neural tube defects; maternal serum AFP after 15 weeks is recommended    Some laboratories offer an NIPT that can detect nonviable trisomies, in addition to 21/18/13/SCA. This is an option Alena could consider should she conceive a pregnancy    We reviewed the benefits and limitations of this testing.  Screening tests provide a risk assessment specific to the pregnancy for certain fetal chromosome abnormalities, but cannot definitively diagnose or exclude a fetal chromosome abnormality.  Follow-up genetic counseling and consideration of diagnostic testing is recommended with any abnormal screening result.     Diagnostic tests carry inherent risks- including risk of miscarriage- that require careful consideration.  These tests can detect fetal chromosome abnormalities with greater than 99% certainty.  Results can be compromised by maternal cell contamination or mosaicism, and are limited by the resolution of cytogenetic G-banding technology.  There is no screening nor diagnostic test that can detect all forms of birth defects or mental disability.     It was a pleasure to be involved with Alena s care. Face-to-face time of the meeting was 35 minutes.    Brittany Antunez MS, [facility]  Licensed Genetic Counselor  [facility]  Pager: [phone]  Office: [phone]     Patient seen, evaluated and discussed with the Genetic Counseling Intern. I have verified the content of the note, which accurately reflects my assessment of the patient and the plan of  care.  Supervising Genetic Counselor    Taylor Fernandez MS, [facility]  Licensed Genetic Counselor  [facility]  Maternal Fetal Medicine  Ph: [phone]  [email or web address]

## 2022-12-05 ENCOUNTER — LAB REQUISITION (OUTPATIENT)
Dept: LAB | Facility: CLINIC | Age: 37
End: 2022-12-05

## 2022-12-05 ENCOUNTER — LAB REQUISITION (OUTPATIENT)
Dept: LAB | Facility: CLINIC | Age: 37
End: 2022-12-05
Payer: COMMERCIAL

## 2022-12-05 DIAGNOSIS — Z3A.08 8 WEEKS GESTATION OF PREGNANCY: ICD-10-CM

## 2022-12-05 DIAGNOSIS — O99.211 OBESITY COMPLICATING PREGNANCY, FIRST TRIMESTER: ICD-10-CM

## 2022-12-05 LAB
BASOPHILS # BLD AUTO: 0 10E3/UL (ref 0–0.2)
BASOPHILS NFR BLD AUTO: 0 %
EOSINOPHIL # BLD AUTO: 0.2 10E3/UL (ref 0–0.7)
EOSINOPHIL NFR BLD AUTO: 2 %
ERYTHROCYTE [DISTWIDTH] IN BLOOD BY AUTOMATED COUNT: 12.5 % (ref 10–15)
HBA1C MFR BLD: 5.5 %
HCT VFR BLD AUTO: 43.6 % (ref 35–47)
HGB BLD-MCNC: 14.5 G/DL (ref 11.7–15.7)
HOLD SPECIMEN: NORMAL
IMM GRANULOCYTES # BLD: 0 10E3/UL
IMM GRANULOCYTES NFR BLD: 0 %
LYMPHOCYTES # BLD AUTO: 3.2 10E3/UL (ref 0.8–5.3)
LYMPHOCYTES NFR BLD AUTO: 35 %
MCH RBC QN AUTO: 30.9 PG (ref 26.5–33)
MCHC RBC AUTO-ENTMCNC: 33.3 G/DL (ref 31.5–36.5)
MCV RBC AUTO: 93 FL (ref 78–100)
MONOCYTES # BLD AUTO: 1 10E3/UL (ref 0–1.3)
MONOCYTES NFR BLD AUTO: 11 %
NEUTROPHILS # BLD AUTO: 4.7 10E3/UL (ref 1.6–8.3)
NEUTROPHILS NFR BLD AUTO: 52 %
NRBC # BLD AUTO: 0 10E3/UL
NRBC BLD AUTO-RTO: 0 /100
PLATELET # BLD AUTO: 357 10E3/UL (ref 150–450)
RBC # BLD AUTO: 4.7 10E6/UL (ref 3.8–5.2)
WBC # BLD AUTO: 9.2 10E3/UL (ref 4–11)

## 2022-12-05 PROCEDURE — 83036 HEMOGLOBIN GLYCOSYLATED A1C: CPT | Performed by: NURSE PRACTITIONER

## 2022-12-05 PROCEDURE — 87086 URINE CULTURE/COLONY COUNT: CPT | Performed by: NURSE PRACTITIONER

## 2022-12-05 PROCEDURE — 87591 N.GONORRHOEAE DNA AMP PROB: CPT | Performed by: NURSE PRACTITIONER

## 2022-12-05 PROCEDURE — 85025 COMPLETE CBC W/AUTO DIFF WBC: CPT | Mod: ORL | Performed by: NURSE PRACTITIONER

## 2022-12-05 PROCEDURE — 86850 RBC ANTIBODY SCREEN: CPT | Performed by: NURSE PRACTITIONER

## 2022-12-05 PROCEDURE — 86762 RUBELLA ANTIBODY: CPT | Mod: ORL | Performed by: NURSE PRACTITIONER

## 2022-12-05 PROCEDURE — 87340 HEPATITIS B SURFACE AG IA: CPT | Mod: ORL | Performed by: NURSE PRACTITIONER

## 2022-12-05 PROCEDURE — 86901 BLOOD TYPING SEROLOGIC RH(D): CPT | Performed by: NURSE PRACTITIONER

## 2022-12-05 PROCEDURE — 87389 HIV-1 AG W/HIV-1&-2 AB AG IA: CPT | Performed by: NURSE PRACTITIONER

## 2022-12-05 PROCEDURE — 86803 HEPATITIS C AB TEST: CPT | Mod: ORL | Performed by: NURSE PRACTITIONER

## 2022-12-05 PROCEDURE — 87491 CHLMYD TRACH DNA AMP PROBE: CPT | Performed by: NURSE PRACTITIONER

## 2022-12-05 PROCEDURE — 86780 TREPONEMA PALLIDUM: CPT | Performed by: NURSE PRACTITIONER

## 2022-12-06 LAB
ABO/RH(D): NORMAL
ANTIBODY SCREEN: NEGATIVE
C TRACH DNA SPEC QL PROBE+SIG AMP: NEGATIVE
HBV SURFACE AG SERPL QL IA: NONREACTIVE
HCV AB SERPL QL IA: NONREACTIVE
HIV 1+2 AB+HIV1 P24 AG SERPL QL IA: NONREACTIVE
N GONORRHOEA DNA SPEC QL NAA+PROBE: NEGATIVE
RUBV IGG SERPL QL IA: 1.85 INDEX
RUBV IGG SERPL QL IA: POSITIVE
SPECIMEN EXPIRATION DATE: NORMAL
T PALLIDUM AB SER QL: NONREACTIVE

## 2022-12-07 LAB — BACTERIA UR CULT: NO GROWTH

## 2022-12-23 ENCOUNTER — TRANSFERRED RECORDS (OUTPATIENT)
Dept: HEALTH INFORMATION MANAGEMENT | Facility: CLINIC | Age: 37
End: 2022-12-23

## 2022-12-23 PROCEDURE — 88305 TISSUE EXAM BY PATHOLOGIST: CPT | Mod: 26 | Performed by: PATHOLOGY

## 2022-12-23 PROCEDURE — 88305 TISSUE EXAM BY PATHOLOGIST: CPT | Mod: TC,ORL | Performed by: OBSTETRICS & GYNECOLOGY

## 2022-12-26 ENCOUNTER — LAB REQUISITION (OUTPATIENT)
Dept: LAB | Facility: CLINIC | Age: 37
End: 2022-12-26
Payer: COMMERCIAL

## 2023-01-13 ENCOUNTER — LAB REQUISITION (OUTPATIENT)
Dept: LAB | Facility: CLINIC | Age: 38
End: 2023-01-13

## 2023-01-13 DIAGNOSIS — N93.9 ABNORMAL UTERINE AND VAGINAL BLEEDING, UNSPECIFIED: ICD-10-CM

## 2023-01-13 LAB — HCG INTACT+B SERPL-ACNC: 203 MIU/ML

## 2023-01-13 PROCEDURE — 84702 CHORIONIC GONADOTROPIN TEST: CPT | Performed by: OBSTETRICS & GYNECOLOGY

## 2023-01-19 ENCOUNTER — ANESTHESIA EVENT (OUTPATIENT)
Dept: SURGERY | Facility: CLINIC | Age: 38
End: 2023-01-19
Payer: COMMERCIAL

## 2023-01-19 ENCOUNTER — APPOINTMENT (OUTPATIENT)
Dept: ULTRASOUND IMAGING | Facility: CLINIC | Age: 38
End: 2023-01-19
Attending: OBSTETRICS & GYNECOLOGY
Payer: COMMERCIAL

## 2023-01-19 ENCOUNTER — HOSPITAL ENCOUNTER (OUTPATIENT)
Facility: CLINIC | Age: 38
Discharge: HOME | End: 2023-01-19
Attending: OBSTETRICS & GYNECOLOGY | Admitting: OBSTETRICS & GYNECOLOGY
Payer: COMMERCIAL

## 2023-01-19 ENCOUNTER — ANESTHESIA (OUTPATIENT)
Dept: SURGERY | Facility: CLINIC | Age: 38
End: 2023-01-19
Payer: COMMERCIAL

## 2023-01-19 ENCOUNTER — LAB REQUISITION (OUTPATIENT)
Dept: LAB | Facility: CLINIC | Age: 38
End: 2023-01-19
Payer: COMMERCIAL

## 2023-01-19 ENCOUNTER — TRANSFERRED RECORDS (OUTPATIENT)
Dept: HEALTH INFORMATION MANAGEMENT | Facility: CLINIC | Age: 38
End: 2023-01-19

## 2023-01-19 VITALS
HEART RATE: 84 BPM | DIASTOLIC BLOOD PRESSURE: 76 MMHG | WEIGHT: 231.4 LBS | SYSTOLIC BLOOD PRESSURE: 128 MMHG | OXYGEN SATURATION: 98 % | TEMPERATURE: 97.3 F | RESPIRATION RATE: 16 BRPM

## 2023-01-19 DIAGNOSIS — O02.1 MISSED ABORTION: ICD-10-CM

## 2023-01-19 LAB — HCG INTACT+B SERPL-ACNC: 110 MIU/ML

## 2023-01-19 PROCEDURE — 710N000009 HC RECOVERY PHASE 1, LEVEL 1, PER MIN: Performed by: OBSTETRICS & GYNECOLOGY

## 2023-01-19 PROCEDURE — 999N000063 US INTRAOPERATIVE: Mod: TC

## 2023-01-19 PROCEDURE — 88305 TISSUE EXAM BY PATHOLOGIST: CPT | Mod: 26 | Performed by: PATHOLOGY

## 2023-01-19 PROCEDURE — 250N000009 HC RX 250: Performed by: OBSTETRICS & GYNECOLOGY

## 2023-01-19 PROCEDURE — 250N000025 HC SEVOFLURANE, PER MIN: Performed by: OBSTETRICS & GYNECOLOGY

## 2023-01-19 PROCEDURE — 250N000011 HC RX IP 250 OP 636: Performed by: NURSE ANESTHETIST, CERTIFIED REGISTERED

## 2023-01-19 PROCEDURE — 360N000075 HC SURGERY LEVEL 2, PER MIN: Performed by: OBSTETRICS & GYNECOLOGY

## 2023-01-19 PROCEDURE — 250N000011 HC RX IP 250 OP 636: Performed by: OBSTETRICS & GYNECOLOGY

## 2023-01-19 PROCEDURE — 250N000013 HC RX MED GY IP 250 OP 250 PS 637: Performed by: OBSTETRICS & GYNECOLOGY

## 2023-01-19 PROCEDURE — 370N000017 HC ANESTHESIA TECHNICAL FEE, PER MIN: Performed by: OBSTETRICS & GYNECOLOGY

## 2023-01-19 PROCEDURE — 250N000009 HC RX 250: Performed by: NURSE ANESTHETIST, CERTIFIED REGISTERED

## 2023-01-19 PROCEDURE — 84702 CHORIONIC GONADOTROPIN TEST: CPT | Mod: ORL | Performed by: OBSTETRICS & GYNECOLOGY

## 2023-01-19 PROCEDURE — 272N000001 HC OR GENERAL SUPPLY STERILE: Performed by: OBSTETRICS & GYNECOLOGY

## 2023-01-19 PROCEDURE — 710N000012 HC RECOVERY PHASE 2, PER MINUTE: Performed by: OBSTETRICS & GYNECOLOGY

## 2023-01-19 PROCEDURE — 999N000141 HC STATISTIC PRE-PROCEDURE NURSING ASSESSMENT: Performed by: OBSTETRICS & GYNECOLOGY

## 2023-01-19 PROCEDURE — 88305 TISSUE EXAM BY PATHOLOGIST: CPT | Mod: TC | Performed by: OBSTETRICS & GYNECOLOGY

## 2023-01-19 PROCEDURE — 258N000003 HC RX IP 258 OP 636: Performed by: ANESTHESIOLOGY

## 2023-01-19 PROCEDURE — 258N000003 HC RX IP 258 OP 636: Performed by: NURSE ANESTHETIST, CERTIFIED REGISTERED

## 2023-01-19 RX ORDER — LISDEXAMFETAMINE DIMESYLATE 50 MG/1
CAPSULE ORAL
Status: ON HOLD | COMMUNITY
Start: 2022-01-01 | End: 2023-12-02

## 2023-01-19 RX ORDER — LABETALOL HYDROCHLORIDE 5 MG/ML
10 INJECTION, SOLUTION INTRAVENOUS EVERY 10 MIN PRN
Status: DISCONTINUED | OUTPATIENT
Start: 2023-01-19 | End: 2023-01-19 | Stop reason: HOSPADM

## 2023-01-19 RX ORDER — FENTANYL CITRATE 50 UG/ML
50 INJECTION, SOLUTION INTRAMUSCULAR; INTRAVENOUS EVERY 5 MIN PRN
Status: DISCONTINUED | OUTPATIENT
Start: 2023-01-19 | End: 2023-01-19 | Stop reason: HOSPADM

## 2023-01-19 RX ORDER — ONDANSETRON 2 MG/ML
INJECTION INTRAMUSCULAR; INTRAVENOUS PRN
Status: DISCONTINUED | OUTPATIENT
Start: 2023-01-19 | End: 2023-01-19

## 2023-01-19 RX ORDER — ONDANSETRON 2 MG/ML
4 INJECTION INTRAMUSCULAR; INTRAVENOUS EVERY 30 MIN PRN
Status: DISCONTINUED | OUTPATIENT
Start: 2023-01-19 | End: 2023-01-19 | Stop reason: HOSPADM

## 2023-01-19 RX ORDER — LIDOCAINE 40 MG/G
CREAM TOPICAL
Status: DISCONTINUED | OUTPATIENT
Start: 2023-01-19 | End: 2023-01-19 | Stop reason: HOSPADM

## 2023-01-19 RX ORDER — SODIUM CHLORIDE, SODIUM LACTATE, POTASSIUM CHLORIDE, CALCIUM CHLORIDE 600; 310; 30; 20 MG/100ML; MG/100ML; MG/100ML; MG/100ML
INJECTION, SOLUTION INTRAVENOUS CONTINUOUS PRN
Status: DISCONTINUED | OUTPATIENT
Start: 2023-01-19 | End: 2023-01-19

## 2023-01-19 RX ORDER — FENTANYL CITRATE 50 UG/ML
25 INJECTION, SOLUTION INTRAMUSCULAR; INTRAVENOUS EVERY 5 MIN PRN
Status: DISCONTINUED | OUTPATIENT
Start: 2023-01-19 | End: 2023-01-19 | Stop reason: HOSPADM

## 2023-01-19 RX ORDER — ONDANSETRON 4 MG/1
4 TABLET, ORALLY DISINTEGRATING ORAL EVERY 30 MIN PRN
Status: DISCONTINUED | OUTPATIENT
Start: 2023-01-19 | End: 2023-01-19 | Stop reason: HOSPADM

## 2023-01-19 RX ORDER — SODIUM CHLORIDE, SODIUM LACTATE, POTASSIUM CHLORIDE, CALCIUM CHLORIDE 600; 310; 30; 20 MG/100ML; MG/100ML; MG/100ML; MG/100ML
INJECTION, SOLUTION INTRAVENOUS CONTINUOUS
Status: DISCONTINUED | OUTPATIENT
Start: 2023-01-19 | End: 2023-01-19 | Stop reason: HOSPADM

## 2023-01-19 RX ORDER — DIAZEPAM 10 MG/2ML
2.5 INJECTION, SOLUTION INTRAMUSCULAR; INTRAVENOUS
Status: DISCONTINUED | OUTPATIENT
Start: 2023-01-19 | End: 2023-01-19 | Stop reason: HOSPADM

## 2023-01-19 RX ORDER — OXYCODONE HYDROCHLORIDE 5 MG/1
5 TABLET ORAL
Status: DISCONTINUED | OUTPATIENT
Start: 2023-01-19 | End: 2023-01-19 | Stop reason: HOSPADM

## 2023-01-19 RX ORDER — PROPOFOL 10 MG/ML
INJECTION, EMULSION INTRAVENOUS PRN
Status: DISCONTINUED | OUTPATIENT
Start: 2023-01-19 | End: 2023-01-19

## 2023-01-19 RX ORDER — FENTANYL CITRATE 50 UG/ML
INJECTION, SOLUTION INTRAMUSCULAR; INTRAVENOUS PRN
Status: DISCONTINUED | OUTPATIENT
Start: 2023-01-19 | End: 2023-01-19

## 2023-01-19 RX ORDER — ALBUTEROL SULFATE 0.83 MG/ML
2.5 SOLUTION RESPIRATORY (INHALATION) EVERY 4 HOURS PRN
Status: DISCONTINUED | OUTPATIENT
Start: 2023-01-19 | End: 2023-01-19 | Stop reason: HOSPADM

## 2023-01-19 RX ORDER — MEPERIDINE HYDROCHLORIDE 25 MG/ML
12.5 INJECTION INTRAMUSCULAR; INTRAVENOUS; SUBCUTANEOUS EVERY 5 MIN PRN
Status: DISCONTINUED | OUTPATIENT
Start: 2023-01-19 | End: 2023-01-19 | Stop reason: HOSPADM

## 2023-01-19 RX ORDER — DOXYCYCLINE 100 MG/1
200 CAPSULE ORAL ONCE
Status: COMPLETED | OUTPATIENT
Start: 2023-01-19 | End: 2023-01-19

## 2023-01-19 RX ORDER — DEXAMETHASONE SODIUM PHOSPHATE 4 MG/ML
4 INJECTION, SOLUTION INTRA-ARTICULAR; INTRALESIONAL; INTRAMUSCULAR; INTRAVENOUS; SOFT TISSUE
Status: DISCONTINUED | OUTPATIENT
Start: 2023-01-19 | End: 2023-01-19 | Stop reason: HOSPADM

## 2023-01-19 RX ORDER — ACETAMINOPHEN 325 MG/1
975 TABLET ORAL ONCE
Status: DISCONTINUED | OUTPATIENT
Start: 2023-01-19 | End: 2023-01-19 | Stop reason: HOSPADM

## 2023-01-19 RX ORDER — LIDOCAINE HYDROCHLORIDE 10 MG/ML
INJECTION, SOLUTION INFILTRATION; PERINEURAL PRN
Status: DISCONTINUED | OUTPATIENT
Start: 2023-01-19 | End: 2023-01-19

## 2023-01-19 RX ORDER — IBUPROFEN 800 MG/1
800 TABLET, FILM COATED ORAL ONCE
Status: DISCONTINUED | OUTPATIENT
Start: 2023-01-19 | End: 2023-01-19 | Stop reason: HOSPADM

## 2023-01-19 RX ORDER — DEXAMETHASONE SODIUM PHOSPHATE 4 MG/ML
INJECTION, SOLUTION INTRA-ARTICULAR; INTRALESIONAL; INTRAMUSCULAR; INTRAVENOUS; SOFT TISSUE PRN
Status: DISCONTINUED | OUTPATIENT
Start: 2023-01-19 | End: 2023-01-19

## 2023-01-19 RX ORDER — KETOROLAC TROMETHAMINE 30 MG/ML
15 INJECTION, SOLUTION INTRAMUSCULAR; INTRAVENOUS ONCE
Status: COMPLETED | OUTPATIENT
Start: 2023-01-19 | End: 2023-01-19

## 2023-01-19 RX ADMIN — FENTANYL CITRATE 50 MCG: 50 INJECTION, SOLUTION INTRAMUSCULAR; INTRAVENOUS at 16:12

## 2023-01-19 RX ADMIN — FENTANYL CITRATE 50 MCG: 50 INJECTION, SOLUTION INTRAMUSCULAR; INTRAVENOUS at 16:18

## 2023-01-19 RX ADMIN — PROPOFOL 200 MG: 10 INJECTION, EMULSION INTRAVENOUS at 16:12

## 2023-01-19 RX ADMIN — DOXYCYCLINE HYCLATE 200 MG: 100 CAPSULE ORAL at 15:30

## 2023-01-19 RX ADMIN — SODIUM CHLORIDE, POTASSIUM CHLORIDE, SODIUM LACTATE AND CALCIUM CHLORIDE: 600; 310; 30; 20 INJECTION, SOLUTION INTRAVENOUS at 15:21

## 2023-01-19 RX ADMIN — ONDANSETRON 4 MG: 2 INJECTION INTRAMUSCULAR; INTRAVENOUS at 16:28

## 2023-01-19 RX ADMIN — LIDOCAINE HYDROCHLORIDE 50 MG: 10 INJECTION, SOLUTION INFILTRATION; PERINEURAL at 16:12

## 2023-01-19 RX ADMIN — DEXAMETHASONE SODIUM PHOSPHATE 8 MG: 4 INJECTION, SOLUTION INTRA-ARTICULAR; INTRALESIONAL; INTRAMUSCULAR; INTRAVENOUS; SOFT TISSUE at 16:12

## 2023-01-19 RX ADMIN — SODIUM CHLORIDE, POTASSIUM CHLORIDE, SODIUM LACTATE AND CALCIUM CHLORIDE: 600; 310; 30; 20 INJECTION, SOLUTION INTRAVENOUS at 15:51

## 2023-01-19 RX ADMIN — MIDAZOLAM 2 MG: 1 INJECTION INTRAMUSCULAR; INTRAVENOUS at 16:04

## 2023-01-19 RX ADMIN — KETOROLAC TROMETHAMINE 15 MG: 30 INJECTION, SOLUTION INTRAMUSCULAR at 16:56

## 2023-01-19 ASSESSMENT — ACTIVITIES OF DAILY LIVING (ADL)
ADLS_ACUITY_SCORE: 35
ADLS_ACUITY_SCORE: 35

## 2023-01-19 NOTE — ANESTHESIA CARE TRANSFER NOTE
Patient: Alena Hall    Procedure: Procedure(s):  DILATION AND CURETTAGE, UTERUS, USING SUCTION with ultrasound guidance       Diagnosis: Missed ab [O02.1]  Diagnosis Additional Information: No value filed.    Anesthesia Type:   General     Note:      Level of Consciousness: awake  Oxygen Supplementation: face mask    Independent Airway: airway patency satisfactory and stable  Dentition: dentition unchanged  Vital Signs Stable: post-procedure vital signs reviewed and stable  Report to RN Given: handoff report given  Patient transferred to: PACU    Handoff Report: Identifed the Patient, Identified the Reponsible Provider, Reviewed the pertinent medical history, Discussed the surgical course, Reviewed Intra-OP anesthesia mangement and issues during anesthesia, Set expectations for post-procedure period and Allowed opportunity for questions and acknowledgement of understanding      Vitals:  Vitals Value Taken Time   /75 01/19/23 1639   Temp     Pulse 83 01/19/23 1641   Resp 17 01/19/23 1641   SpO2 98 % 01/19/23 1641   Vitals shown include unvalidated device data.    Electronically Signed By: SIRISHA Sullivan CRNA  January 19, 2023  4:42 PM

## 2023-01-19 NOTE — DISCHARGE INSTRUCTIONS
" Loss Discharge Instructions   We suggest you see your doctor within one to two weeks to check on your physical and emotional recovery.  This will be a good time to talk thru your experience and discuss further follow-up.       The Blues and Grief  After delivery you will have hormone changes and strong emotions, often referred to as the \"baby blues\".  You may find yourself easily upset, tearful or angry.  In addition, you will be grieving for your own loss.  You may feel terribly tired and not want to face friends, family or new babies.    Your feelings will be hard to predict during this time.  You may have many ups and downs.  Be kind and patient with yourself.    No two people grieve the same way.  This is true of spouses and partners.  Try to have open communication, but don't depend solely on each other for support.  Reach out to friends, family, clergy and your health care providers.  You don't have to handle this alone.    Normal grief after a pregnancy or  loss often lasts for weeks or months.  Over time, you will have more good days than bad ones.  The first year is usually the hardest as you face significant dates and events for the first time.    At first, your sadness or anxiety may keep you from sleeping, eating, being with others or getting out of the house.  If, after a week, you aren't able to take care of basic daily tasks, please call your care provider right away.  It could be more serious than the blues or grief.  Going back to work:  Even though you did not bring home a living baby, you and your partner have a right to any family and bereavement leave benefits your employer offers.  When you do return to work, be prepared for some adjustment time.    Call your health care provider if you have any of these symptoms:  You soak a sanitary pad with blood within 1 hour, or you see blood clots larger than a golf ball.  Bleeding that lasts more than 6 weeks.  Vaginal discharge that " smells bad.   A fever above 100.4 F (38 C) or higher (temperature taken under your tongue), with or without chills.  Severe, pain, cramping or tenderness in your lower belly area.  Pain that increases or does not go away from an episiotomy or perineal tear.  Increases pain, swelling, redness or fluid around your stitches.  A more frequent or urgent need to urinate (pee), or it burns when you pee.  Redness, swelling or pain around a vein in your leg.  Problems with coping with sadness, anxiety, or depression.  Your breasts are engorged (hard and swollen), red and very tender and you have a fever.   Nausea and vomiting.  Chest pain and cough or are gasping for air.  You have questions or concerns after you return home.    Keep your hands clean:  Always wash your hands before touching your perineal area and stitches.  This helps reduce your risk of infection.  If your hands aren't dirty, you may use an alcohol hand-rub to clean your hands. Keep your nails clean and short.        GENERAL ANESTHESIA OR SEDATION ADULT DISCHARGE INSTRUCTIONS   SPECIAL PRECAUTIONS FOR 24 HOURS AFTER SURGERY    IT IS NOT UNUSUAL TO FEEL LIGHT-HEADED OR FAINT, UP TO 24 HOURS AFTER SURGERY OR WHILE TAKING PAIN MEDICATION.  IF YOU HAVE THESE SYMPTOMS; SIT FOR A FEW MINUTES BEFORE STANDING AND HAVE SOMEONE ASSIST YOU WHEN YOU GET UP TO WALK OR USE THE BATHROOM.    YOU SHOULD REST AND RELAX FOR THE NEXT 24 HOURS AND YOU MUST MAKE ARRANGEMENTS TO HAVE SOMEONE STAY WITH YOU FOR AT LEAST 24 HOURS AFTER YOUR DISCHARGE.  AVOID HAZARDOUS AND STRENUOUS ACTIVITIES.  DO NOT MAKE IMPORTANT DECISIONS FOR 24 HOURS.    DO NOT DRIVE ANY VEHICLE OR OPERATE MECHANICAL EQUIPMENT FOR 24 HOURS FOLLOWING THE END OF YOUR SURGERY.  EVEN THOUGH YOU MAY FEEL NORMAL, YOUR REACTIONS MAY BE AFFECTED BY THE MEDICATION YOU HAVE RECEIVED.    DO NOT DRINK ALCOHOLIC BEVERAGES FOR 24 HOURS FOLLOWING YOUR SURGERY.    DRINK CLEAR LIQUIDS (APPLE JUICE, GINGER ALE, 7-UP, BROTH,  "ETC.).  PROGRESS TO YOUR REGULAR DIET AS YOU FEEL ABLE.    YOU MAY HAVE A DRY MOUTH, A SORE THROAT, MUSCLES ACHES OR TROUBLE SLEEPING.  THESE SHOULD GO AWAY AFTER 24 HOURS.    CALL YOUR DOCTOR FOR ANY OF THE FOLLOWING:  SIGNS OF INFECTION (FEVER, GROWING TENDERNESS AT THE SURGERY SITE, A LARGE AMOUNT OF DRAINAGE OR BLEEDING, SEVERE PAIN, FOUL-SMELLING DRAINAGE, REDNESS OR SWELLING.    IT HAS BEEN OVER 8 TO 10 HOURS SINCE SURGERY AND YOU ARE STILL NOT ABLE TO URINATE (PASS WATER).     DILATION AND CURETTAGE AND DILATION AND EVACUATION DISCHARGE INSTRUCTIONS  (FOR PATIENTS WHOSE PREGNANCY HAS ENDED)    FOR THE FIRST 1 TO 2 DAYS    -You may feel drowsy and weak.  -Don't drive a car, drink alcohol or use machinery for 24 hours.  -Get plenty of rest.  -You may bathe or shower and climb stairs.  You can go back to other activities after your pain goes away.    -Eat light meals and drink plenty of liquids for the first 24 hours.  -If you feel sick to your stomach, stay in bed.  Drink small amounts of clear liquids like 7-Up, tea or soup.    FEVER AND DISCOMFORT  -You may have a low fever (less than 100.4F or 38C or belly cramps.  -Take acetaminophen (Tylenol), ibuprofen (Advil, Motrin) or aspirin as directed for pain.  Or, your doctor may give you pain medicine.  -A heating pad set to \"low\" or a hot water bottle can help with cramps and backaches.    BLEEDING AND DISCHARGE  -You may have some vaginal bleeding or discharge for up to 6 weeks.  -Don't use tampons during the first week.  -Avoid sex until your bleeding has completely stopped.    GENERAL TIPS TO HELP PREVENT INFECTION  -Take any medicine that you were given to prevent infection.  -Always wash your hands before touching your stitches or the area around your vagina.  If your hands don't look dirty, you may use an alcohol hand-rub to clean them.  -Keep your nails clean and short.    BREAST OR CHEST PAIN  Your body may still try to make milk for 3 to 4 days, even " though the pregnancy has ended.  If this happens, your breasts or chest area may become hard and painful.  To help feel more comfortable, try the following tips  -Wear a tight-fitting bra (like a sports bra) day and night.  -Take Motrin (ibuprofen) for pain.  -Don't try to pump or express your milk unless you are interested in donating it to a milk bank.  We can give you information about how to do that.  -Avoid breast or chest stimulation (during sex, for example).  -Consider letting loved ones know if hugs are painful.  Your body will stop making milk in about a week.    RETURNING TO WORK  Your body may be ready to return to work after 24 hours.  But emotionally, many patients need days or weeks before they feel ready to go back.  Your doctor can write a note to excuse you from work.    Once you go back to work, be prepared for some adjustment time.  Many people may be unaware that your pregnancy has ended.  This can be difficult.  You may find it helpful to tell a few close coworkers about your loss and ask them to quietly spread the word.    FOLLOW-UP VISITS  It is very important to make a follow-up visit with your doctor, even if you a feeling better.  Your doctor will tell you when you should come in.  -During this visit, we will make sure that you are healing physically and emotionally.  This is a good time to talk through your experience and discuss other follow-up visits.  -If you see a doctor in the next 12 weeks for something else, be sure to tell the doctor about your pregnancy and the date it ended.  -If you decide to get pregnant again, we urge you to talk to your provider early and often.  Talk about any concerns you may have, such as how to take care of yourself during another pregnancy.    WHEN TO SEEK MEDICAL HELP  -Call 911 or go to the Emergency Department if you have thoughts of harming yourself or others.  -Call your clinic if you have any of these issues:  Fever over 100.4F (38C) that lasts  more than 2 days.  2.  You soak more than one maxi pad or sanitary napkin with blood within 1 hour.  3.  You pass blood clots larger than a golf ball.  4.  Discharge from your vagina that smells bad.  5.  Very bad pain.  6.  Problems coping with sadness, anxiety or depression.      DR. PETRA CHA M.D.                     CLINIC PHONE NUMBER:  [phone]  [facility]    GENERAL ANESTHESIA OR SEDATION ADULT DISCHARGE INSTRUCTIONS   SPECIAL PRECAUTIONS FOR 24 HOURS AFTER SURGERY    IT IS NOT UNUSUAL TO FEEL LIGHT-HEADED OR FAINT, UP TO 24 HOURS AFTER SURGERY OR WHILE TAKING PAIN MEDICATION.  IF YOU HAVE THESE SYMPTOMS; SIT FOR A FEW MINUTES BEFORE STANDING AND HAVE SOMEONE ASSIST YOU WHEN YOU GET UP TO WALK OR USE THE BATHROOM.    YOU SHOULD REST AND RELAX FOR THE NEXT 24 HOURS AND YOU MUST MAKE ARRANGEMENTS TO HAVE SOMEONE STAY WITH YOU FOR AT LEAST 24 HOURS AFTER YOUR DISCHARGE.  AVOID HAZARDOUS AND STRENUOUS ACTIVITIES.  DO NOT MAKE IMPORTANT DECISIONS FOR 24 HOURS.    DO NOT DRIVE ANY VEHICLE OR OPERATE MECHANICAL EQUIPMENT FOR 24 HOURS FOLLOWING THE END OF YOUR SURGERY.  EVEN THOUGH YOU MAY FEEL NORMAL, YOUR REACTIONS MAY BE AFFECTED BY THE MEDICATION YOU HAVE RECEIVED.    DO NOT DRINK ALCOHOLIC BEVERAGES FOR 24 HOURS FOLLOWING YOUR SURGERY.    DRINK CLEAR LIQUIDS (APPLE JUICE, GINGER ALE, 7-UP, BROTH, ETC.).  PROGRESS TO YOUR REGULAR DIET AS YOU FEEL ABLE.    YOU MAY HAVE A DRY MOUTH, A SORE THROAT, MUSCLES ACHES OR TROUBLE SLEEPING.  THESE SHOULD GO AWAY AFTER 24 HOURS.    CALL YOUR DOCTOR FOR ANY OF THE FOLLOWING:  SIGNS OF INFECTION (FEVER, GROWING TENDERNESS AT THE SURGERY SITE, A LARGE AMOUNT OF DRAINAGE OR BLEEDING, SEVERE PAIN, FOUL-SMELLING DRAINAGE, REDNESS OR SWELLING.    IT HAS BEEN OVER 8 TO 10 HOURS SINCE SURGERY AND YOU ARE STILL NOT ABLE TO URINATE (PASS WATER).     You received Toradol, an IV form of Ibuprofen (Motrin) at 5pm.  Do not take any Ibuprofen products until 11pm.

## 2023-01-19 NOTE — ANESTHESIA PREPROCEDURE EVALUATION
Anesthesia Pre-Procedure Evaluation    Patient: Alena Hall   MRN: [id number] : 1985        Procedure : Procedure(s):  DILATION AND CURETTAGE, UTERUS, USING SUCTION          Past Medical History:   Diagnosis Date     Anxiety      Depression 2012    on meds     Homozygous MTHFR mutation C677T 2013    normal homocystein level      Past Surgical History:   Procedure Laterality Date      SECTION N/A 2020    Procedure:  SECTION;  Surgeon: Chana Chun MD;  Location:  [facility]      No Known Allergies   Social History     Tobacco Use     Smoking status: Never     Smokeless tobacco: Not on file   Substance Use Topics     Alcohol use: Not on file      Wt Readings from Last 1 Encounters:   23 105 kg (231 lb 6.4 oz)        Anesthesia Evaluation   Pt has had prior anesthetic. Type: General.    No history of anesthetic complications       ROS/MED HX  ENT/Pulmonary:  - neg pulmonary ROS     Neurologic:  - neg neurologic ROS     Cardiovascular:  - neg cardiovascular ROS     METS/Exercise Tolerance:     Hematologic:  - neg hematologic  ROS     Musculoskeletal:  - neg musculoskeletal ROS     GI/Hepatic:  - neg GI/hepatic ROS     Renal/Genitourinary: Comment: Incomplete       Endo:     (+) Obesity,     Psychiatric/Substance Use:  - neg psychiatric ROS     Infectious Disease:  - neg infectious disease ROS     Malignancy:  - neg malignancy ROS     Other:  - neg other ROS          Physical Exam    Airway        Mallampati: II   TM distance: > 3 FB   Neck ROM: full   Mouth opening: > 3 cm    Respiratory Devices and Support         Dental           Cardiovascular   cardiovascular exam normal          Pulmonary   pulmonary exam normal                OUTSIDE LABS:  CBC:   Lab Results   Component Value Date    WBC 9.2 2022    WBC 19.3 (H) 2020    HGB 14.5 2022    HGB 11.8 2020    HCT 43.6 2022    HCT 39.5 2020     2022    PLT  255 09/28/2020     BMP: No results found for: NA, POTASSIUM, CHLORIDE, CO2, BUN, CR, GLC  COAGS:   Lab Results   Component Value Date    PTT 31 12/26/2013    INR 1.0 12/26/2013     POC: No results found for: BGM, HCG, HCGS  HEPATIC: No results found for: ALBUMIN, PROTTOTAL, ALT, AST, GGT, ALKPHOS, BILITOTAL, BILIDIRECT, AD  OTHER:   Lab Results   Component Value Date    A1C 5.5 12/05/2022       Anesthesia Plan    ASA Status:  2   NPO Status:  NPO Appropriate    Anesthesia Type: General.     - Airway: LMA   Induction: Intravenous, Propofol.   Maintenance: Balanced.        Consents    Anesthesia Plan(s) and associated risks, benefits, and realistic alternatives discussed. Questions answered and patient/representative(s) expressed understanding.    - Discussed:     - Discussed with:  Patient         Postoperative Care    Pain management: IV analgesics, Oral pain medications, Multi-modal analgesia.   PONV prophylaxis: Ondansetron (or other 5HT-3), Dexamethasone or Solumedrol     Comments:                Edwin Walters MD

## 2023-01-19 NOTE — ANESTHESIA POSTPROCEDURE EVALUATION
Patient: Alena Hall    Procedure: Procedure(s):  DILATION AND CURETTAGE, UTERUS, USING SUCTION with ultrasound guidance       Anesthesia Type:  General    Note:     Postop Pain Control: Uneventful            Sign Out: Well controlled pain   PONV: No   Neuro/Psych: Uneventful            Sign Out: Acceptable/Baseline neuro status   Airway/Respiratory: Uneventful            Sign Out: Acceptable/Baseline resp. status   CV/Hemodynamics: Uneventful            Sign Out: Acceptable CV status   Other NRE: NONE   DID A NON-ROUTINE EVENT OCCUR? No           Last vitals:  Vitals Value Taken Time   /80 01/19/23 1700   Temp 97.9  F (36.6  C) 01/19/23 1640   Pulse 100 01/19/23 1702   Resp 15 01/19/23 1702   SpO2 95 % 01/19/23 1702   Vitals shown include unvalidated device data.    Electronically Signed By: Edwin Walters MD  January 19, 2023  5:03 PM

## 2023-01-19 NOTE — BRIEF OP NOTE
[facility] Brief Operative Note    Pre-operative diagnosis: Missed ab [O02.1], Retained products of conception   Post-operative diagnosis Same   Procedure: Procedure(s):  DILATION AND CURETTAGE, UTERUS, USING SUCTION with ultrasound guidance   Surgeon(s): Surgeon(s) and Role:     * Thalia Luis MD - Primary   Estimated blood loss: 2cc   Specimens: ID Type Source Tests Collected by Time Destination   1 : Products of conception Tissue Products of Conception SURGICAL PATHOLOGY EXAM Thalia Luis MD 1/19/2023  4:22 PM       Findings: Small amount of POC's obtained. No increased flow or thickened lining on postop transvaginal US

## 2023-01-20 NOTE — OP NOTE
Gynecology Operative Note  [facility]    PREOPERATIVE DIAGNOSES:   1. Retained products of conception after miscarriage  POSTOPERATIVE DIAGNOSES:   1. Same  PROCEDURE:   1. Suction dilation & curettage under ultrasound guidance  ESTIMATED BLOOD LOSS: 2 ml  ANESTHESIA: General  SPECIMENS: Retained products of conception  FINDINGS: Small amount of products of conception, normal endometrial lining on transvaginal ultrasound post-procedure  COMPLICATIONS: None   INDICATIONS: 36yo female presented to the office this morning with continued bleeding after miscarriage and subsequent D&C.Had miscarriage diagnosed 12/22/22. Underwent suction D&C on 12/23/22. Pathology with POCs and chromosome study indicating Trisomy 21. Pt reports intercourse 2 weeks after procedure. Pt reports continued bleeding after procedure. BHCG on 1/13/23 was 203. US today with endometrium 19 mm thick and heterogeneous with blood flow concerning for retained products of conception. Repeat bHCG today was 110, consistent with retained POC's. She is leaving the country for a trip tomorrow and repeat D&C was recommended today. Risks, benefits and alteratives to the procedure were discussed with the patient and consent form was signed prior to the procedure.  PROCEDURE: The patient was taken to the operating room and placed on the operating room table. General anesthesia was administered without difficulty and she was placed in the dorsal lithotomy position. Perineum and vagina were sterilely prepped and draped. Time out was performed confirming patient and procedure. An open sided speculum was placed in the patient's vagina and the anterior lip of the cervix was grasped with a single tooth tenaculum. The cervix was progressively dilated to a 8mm Buffy dilator. The remainder of the procedure was performed under US guidance. The 8mm rigid curved suction cannula was advanced through the cervix and suction was applied. With 3 passes, all  products of conception were removed and placed in saline. No further bleeding was noted at this time. The suction and tenaculum were removed. The tenaculum site was made hemostatic with silver nitrate. The speculum was removed. A transvaginal ultrasound was then performed and thin EMS with no increased colorflow was noted.  The patient was returned to the supine position, extubated and taken to the recovery room in good condition. There were no complications.     Thalia Luis MD

## 2023-02-18 ENCOUNTER — HEALTH MAINTENANCE LETTER (OUTPATIENT)
Age: 38
End: 2023-02-18

## 2023-03-08 ENCOUNTER — LAB REQUISITION (OUTPATIENT)
Dept: LAB | Facility: CLINIC | Age: 38
End: 2023-03-08
Payer: COMMERCIAL

## 2023-03-08 DIAGNOSIS — N91.2 AMENORRHEA, UNSPECIFIED: ICD-10-CM

## 2023-03-08 LAB
ESTRADIOL SERPL-MCNC: 67 PG/ML
FSH SERPL IRP2-ACNC: 3.8 MIU/ML
LH SERPL-ACNC: 4.9 MIU/ML
MIS SERPL-MCNC: 0.47 NG/ML (ref 0.15–7.5)
PROGEST SERPL-MCNC: 1.9 NG/ML
PROLACTIN SERPL 3RD IS-MCNC: 10 NG/ML (ref 5–23)
SHBG SERPL-SCNC: 36 NMOL/L (ref 30–135)
TSH SERPL DL<=0.005 MIU/L-ACNC: 2.7 UIU/ML (ref 0.3–4.2)

## 2023-03-08 PROCEDURE — 84403 ASSAY OF TOTAL TESTOSTERONE: CPT | Mod: ORL | Performed by: OBSTETRICS & GYNECOLOGY

## 2023-03-08 PROCEDURE — 83001 ASSAY OF GONADOTROPIN (FSH): CPT | Mod: ORL | Performed by: OBSTETRICS & GYNECOLOGY

## 2023-03-08 PROCEDURE — 82670 ASSAY OF TOTAL ESTRADIOL: CPT | Mod: ORL | Performed by: OBSTETRICS & GYNECOLOGY

## 2023-03-08 PROCEDURE — 84144 ASSAY OF PROGESTERONE: CPT | Mod: ORL | Performed by: OBSTETRICS & GYNECOLOGY

## 2023-03-08 PROCEDURE — 84270 ASSAY OF SEX HORMONE GLOBUL: CPT | Mod: ORL | Performed by: OBSTETRICS & GYNECOLOGY

## 2023-03-08 PROCEDURE — 83002 ASSAY OF GONADOTROPIN (LH): CPT | Mod: ORL | Performed by: OBSTETRICS & GYNECOLOGY

## 2023-03-08 PROCEDURE — 84443 ASSAY THYROID STIM HORMONE: CPT | Mod: ORL | Performed by: OBSTETRICS & GYNECOLOGY

## 2023-03-08 PROCEDURE — 84146 ASSAY OF PROLACTIN: CPT | Mod: ORL | Performed by: OBSTETRICS & GYNECOLOGY

## 2023-03-08 PROCEDURE — 82627 DEHYDROEPIANDROSTERONE: CPT | Mod: ORL | Performed by: OBSTETRICS & GYNECOLOGY

## 2023-03-08 PROCEDURE — 83520 IMMUNOASSAY QUANT NOS NONAB: CPT | Mod: ORL | Performed by: OBSTETRICS & GYNECOLOGY

## 2023-03-09 LAB — DHEA-S SERPL-MCNC: 214 UG/DL (ref 35–430)

## 2023-03-10 LAB
TESTOST FREE SERPL-MCNC: 0.73 NG/DL
TESTOST SERPL-MCNC: 43 NG/DL (ref 8–60)

## 2023-05-04 ENCOUNTER — LAB REQUISITION (OUTPATIENT)
Dept: LAB | Facility: CLINIC | Age: 38
End: 2023-05-04
Payer: COMMERCIAL

## 2023-05-04 DIAGNOSIS — Z34.91 ENCOUNTER FOR SUPERVISION OF NORMAL PREGNANCY, UNSPECIFIED, FIRST TRIMESTER: ICD-10-CM

## 2023-05-04 LAB
ABO/RH(D): NORMAL
ANTIBODY SCREEN: NEGATIVE
BASOPHILS # BLD AUTO: 0 10E3/UL (ref 0–0.2)
BASOPHILS NFR BLD AUTO: 0 %
EOSINOPHIL # BLD AUTO: 0.1 10E3/UL (ref 0–0.7)
EOSINOPHIL NFR BLD AUTO: 1 %
ERYTHROCYTE [DISTWIDTH] IN BLOOD BY AUTOMATED COUNT: 12.4 % (ref 10–15)
HCT VFR BLD AUTO: 44.1 % (ref 35–47)
HGB BLD-MCNC: 14.4 G/DL (ref 11.7–15.7)
IMM GRANULOCYTES # BLD: 0 10E3/UL
IMM GRANULOCYTES NFR BLD: 0 %
LYMPHOCYTES # BLD AUTO: 3.1 10E3/UL (ref 0.8–5.3)
LYMPHOCYTES NFR BLD AUTO: 25 %
MCH RBC QN AUTO: 30.1 PG (ref 26.5–33)
MCHC RBC AUTO-ENTMCNC: 32.7 G/DL (ref 31.5–36.5)
MCV RBC AUTO: 92 FL (ref 78–100)
MONOCYTES # BLD AUTO: 1 10E3/UL (ref 0–1.3)
MONOCYTES NFR BLD AUTO: 8 %
NEUTROPHILS # BLD AUTO: 8.3 10E3/UL (ref 1.6–8.3)
NEUTROPHILS NFR BLD AUTO: 66 %
NRBC # BLD AUTO: 0 10E3/UL
NRBC BLD AUTO-RTO: 0 /100
PLATELET # BLD AUTO: 308 10E3/UL (ref 150–450)
RBC # BLD AUTO: 4.79 10E6/UL (ref 3.8–5.2)
SPECIMEN EXPIRATION DATE: NORMAL
WBC # BLD AUTO: 12.6 10E3/UL (ref 4–11)

## 2023-05-04 PROCEDURE — 86803 HEPATITIS C AB TEST: CPT | Mod: ORL | Performed by: OBSTETRICS & GYNECOLOGY

## 2023-05-04 PROCEDURE — 85025 COMPLETE CBC W/AUTO DIFF WBC: CPT | Mod: ORL | Performed by: OBSTETRICS & GYNECOLOGY

## 2023-05-04 PROCEDURE — 87591 N.GONORRHOEAE DNA AMP PROB: CPT | Mod: ORL | Performed by: OBSTETRICS & GYNECOLOGY

## 2023-05-04 PROCEDURE — G0145 SCR C/V CYTO,THINLAYER,RESCR: HCPCS | Mod: ORL | Performed by: OBSTETRICS & GYNECOLOGY

## 2023-05-04 PROCEDURE — 87389 HIV-1 AG W/HIV-1&-2 AB AG IA: CPT | Mod: ORL | Performed by: OBSTETRICS & GYNECOLOGY

## 2023-05-04 PROCEDURE — 87340 HEPATITIS B SURFACE AG IA: CPT | Mod: ORL | Performed by: OBSTETRICS & GYNECOLOGY

## 2023-05-04 PROCEDURE — 86592 SYPHILIS TEST NON-TREP QUAL: CPT | Mod: ORL | Performed by: OBSTETRICS & GYNECOLOGY

## 2023-05-04 PROCEDURE — 87624 HPV HI-RISK TYP POOLED RSLT: CPT | Mod: ORL | Performed by: OBSTETRICS & GYNECOLOGY

## 2023-05-04 PROCEDURE — 86762 RUBELLA ANTIBODY: CPT | Mod: ORL | Performed by: OBSTETRICS & GYNECOLOGY

## 2023-05-04 PROCEDURE — 87086 URINE CULTURE/COLONY COUNT: CPT | Mod: ORL | Performed by: OBSTETRICS & GYNECOLOGY

## 2023-05-04 PROCEDURE — 86850 RBC ANTIBODY SCREEN: CPT | Mod: ORL | Performed by: OBSTETRICS & GYNECOLOGY

## 2023-05-05 LAB
C TRACH DNA SPEC QL PROBE+SIG AMP: NEGATIVE
HBV SURFACE AG SERPL QL IA: NONREACTIVE
HCV AB SERPL QL IA: NONREACTIVE
HIV 1+2 AB+HIV1 P24 AG SERPL QL IA: NONREACTIVE
N GONORRHOEA DNA SPEC QL NAA+PROBE: NEGATIVE
RPR SER QL: NONREACTIVE

## 2023-05-06 LAB — BACTERIA UR CULT: NORMAL

## 2023-05-09 LAB
RUBV IGG SERPL QL IA: 1.63 INDEX
RUBV IGG SERPL QL IA: POSITIVE

## 2023-05-10 LAB
BKR LAB AP GYN ADEQUACY: NORMAL
BKR LAB AP GYN INTERPRETATION: NORMAL
BKR LAB AP HPV REFLEX: NORMAL
BKR LAB AP LMP: NORMAL
BKR LAB AP PREVIOUS ABNL DX: NORMAL
BKR LAB AP PREVIOUS ABNORMAL: NORMAL
PATH REPORT.COMMENTS IMP SPEC: NORMAL
PATH REPORT.COMMENTS IMP SPEC: NORMAL
PATH REPORT.RELEVANT HX SPEC: NORMAL

## 2023-05-12 LAB
HUMAN PAPILLOMA VIRUS 16 DNA: NEGATIVE
HUMAN PAPILLOMA VIRUS 18 DNA: NEGATIVE
HUMAN PAPILLOMA VIRUS FINAL DIAGNOSIS: NORMAL
HUMAN PAPILLOMA VIRUS OTHER HR: NEGATIVE

## 2023-06-12 ENCOUNTER — LAB REQUISITION (OUTPATIENT)
Dept: LAB | Facility: CLINIC | Age: 38
End: 2023-06-12
Payer: COMMERCIAL

## 2023-06-12 DIAGNOSIS — Z13.79 ENCOUNTER FOR OTHER SCREENING FOR GENETIC AND CHROMOSOMAL ANOMALIES: ICD-10-CM

## 2023-06-12 PROCEDURE — 82105 ALPHA-FETOPROTEIN SERUM: CPT | Mod: ORL | Performed by: OBSTETRICS & GYNECOLOGY

## 2023-06-14 LAB
# FETUSES US: NORMAL
AFP MOM SERPL: 1.57
AFP SERPL-MCNC: 38 NG/ML
AGE - REPORTED: 37.4 YR
CURRENT SMOKER: NO
FAMILY MEMBER DISEASES HX: NO
GA METHOD: NORMAL
GA: NORMAL WK
IDDM PATIENT QL: NO
INTEGRATED SCN PATIENT-IMP: NORMAL
SPECIMEN DRAWN SERPL: NORMAL

## 2023-07-13 ENCOUNTER — TRANSFERRED RECORDS (OUTPATIENT)
Dept: HEALTH INFORMATION MANAGEMENT | Facility: CLINIC | Age: 38
End: 2023-07-13
Payer: COMMERCIAL

## 2023-07-14 ENCOUNTER — MEDICAL CORRESPONDENCE (OUTPATIENT)
Dept: HEALTH INFORMATION MANAGEMENT | Facility: CLINIC | Age: 38
End: 2023-07-14
Payer: COMMERCIAL

## 2023-07-14 ENCOUNTER — PRE VISIT (OUTPATIENT)
Dept: MATERNAL FETAL MEDICINE | Facility: CLINIC | Age: 38
End: 2023-07-14
Payer: COMMERCIAL

## 2023-07-14 ENCOUNTER — TRANSCRIBE ORDERS (OUTPATIENT)
Dept: MATERNAL FETAL MEDICINE | Facility: CLINIC | Age: 38
End: 2023-07-14
Payer: COMMERCIAL

## 2023-07-14 DIAGNOSIS — O26.90 PREGNANCY RELATED CONDITION, ANTEPARTUM: Primary | ICD-10-CM

## 2023-07-21 ENCOUNTER — HOSPITAL ENCOUNTER (OUTPATIENT)
Dept: ULTRASOUND IMAGING | Facility: CLINIC | Age: 38
Discharge: HOME | End: 2023-07-21
Attending: MIDWIFE
Payer: COMMERCIAL

## 2023-07-21 ENCOUNTER — OFFICE VISIT (OUTPATIENT)
Dept: MATERNAL FETAL MEDICINE | Facility: CLINIC | Age: 38
End: 2023-07-21
Attending: MIDWIFE
Payer: COMMERCIAL

## 2023-07-21 DIAGNOSIS — O26.90 PREGNANCY RELATED CONDITION, ANTEPARTUM: ICD-10-CM

## 2023-07-21 DIAGNOSIS — O09.522 MULTIGRAVIDA OF ADVANCED MATERNAL AGE IN SECOND TRIMESTER: Primary | ICD-10-CM

## 2023-07-21 DIAGNOSIS — Z36.2 ENCOUNTER FOR FOLLOW-UP ULTRASOUND OF FETAL ANATOMY: ICD-10-CM

## 2023-07-21 PROCEDURE — 76811 OB US DETAILED SNGL FETUS: CPT | Mod: 26 | Performed by: OBSTETRICS & GYNECOLOGY

## 2023-07-21 PROCEDURE — 76811 OB US DETAILED SNGL FETUS: CPT

## 2023-07-21 NOTE — PROGRESS NOTES
"Please see \"imaging\" tab under \"Chart Review\" for details of today's US at the [facility].    Angel Taylor MD  Maternal-Fetal Medicine    "

## 2023-07-21 NOTE — NURSING NOTE
Patient reports starting to feel fetal movement, no pain, no contractions, leaking of fluid, or bleeding.  SBAR given to SONG MONTERO, see their note in Epic.

## 2023-08-10 ENCOUNTER — TRANSFERRED RECORDS (OUTPATIENT)
Dept: HEALTH INFORMATION MANAGEMENT | Facility: CLINIC | Age: 38
End: 2023-08-10
Payer: COMMERCIAL

## 2023-08-18 ENCOUNTER — HOSPITAL ENCOUNTER (OUTPATIENT)
Dept: ULTRASOUND IMAGING | Facility: CLINIC | Age: 38
Discharge: HOME | End: 2023-08-18
Attending: OBSTETRICS & GYNECOLOGY
Payer: COMMERCIAL

## 2023-08-18 ENCOUNTER — OFFICE VISIT (OUTPATIENT)
Dept: MATERNAL FETAL MEDICINE | Facility: CLINIC | Age: 38
End: 2023-08-18
Attending: OBSTETRICS & GYNECOLOGY
Payer: COMMERCIAL

## 2023-08-18 DIAGNOSIS — Z36.2 ENCOUNTER FOR FOLLOW-UP ULTRASOUND OF FETAL ANATOMY: Primary | ICD-10-CM

## 2023-08-18 DIAGNOSIS — Z36.2 ENCOUNTER FOR FOLLOW-UP ULTRASOUND OF FETAL ANATOMY: ICD-10-CM

## 2023-08-18 PROCEDURE — 76816 OB US FOLLOW-UP PER FETUS: CPT | Mod: 26 | Performed by: OBSTETRICS & GYNECOLOGY

## 2023-08-18 PROCEDURE — 76816 OB US FOLLOW-UP PER FETUS: CPT

## 2023-08-18 NOTE — PROGRESS NOTES
"Please see \"imaging\" tab under \"Chart Review\" for details of today's US at the [facility].    Angel Taylor MD  Maternal-Fetal Medicine    "

## 2023-08-18 NOTE — NURSING NOTE
Patient presents to [facility] for RL2 at 25w3d due to suboptimal anatomy. Positive fetal movement. Denies LOF, vaginal bleeding or cramping/contractions. SBAR given to [facility] MD, see their note in Epic.

## 2023-09-08 ENCOUNTER — LAB REQUISITION (OUTPATIENT)
Dept: LAB | Facility: CLINIC | Age: 38
End: 2023-09-08
Payer: COMMERCIAL

## 2023-09-08 DIAGNOSIS — Z36.89 ENCOUNTER FOR OTHER SPECIFIED ANTENATAL SCREENING: ICD-10-CM

## 2023-09-08 LAB
ERYTHROCYTE [DISTWIDTH] IN BLOOD BY AUTOMATED COUNT: 13 % (ref 10–15)
HCT VFR BLD AUTO: 40.3 % (ref 35–47)
HGB BLD-MCNC: 13.4 G/DL (ref 11.7–15.7)
MCH RBC QN AUTO: 31.2 PG (ref 26.5–33)
MCHC RBC AUTO-ENTMCNC: 33.3 G/DL (ref 31.5–36.5)
MCV RBC AUTO: 94 FL (ref 78–100)
PLATELET # BLD AUTO: 279 10E3/UL (ref 150–450)
RBC # BLD AUTO: 4.29 10E6/UL (ref 3.8–5.2)
WBC # BLD AUTO: 11.8 10E3/UL (ref 4–11)

## 2023-09-08 PROCEDURE — 86592 SYPHILIS TEST NON-TREP QUAL: CPT | Mod: ORL | Performed by: OBSTETRICS & GYNECOLOGY

## 2023-09-08 PROCEDURE — 85027 COMPLETE CBC AUTOMATED: CPT | Mod: ORL | Performed by: OBSTETRICS & GYNECOLOGY

## 2023-09-09 DIAGNOSIS — Z01.818 PRE-OP EXAM: Primary | ICD-10-CM

## 2023-09-11 LAB — RPR SER QL: NONREACTIVE

## 2023-11-03 ENCOUNTER — LAB REQUISITION (OUTPATIENT)
Dept: LAB | Facility: CLINIC | Age: 38
End: 2023-11-03
Payer: COMMERCIAL

## 2023-11-03 DIAGNOSIS — Z3A.36 36 WEEKS GESTATION OF PREGNANCY: ICD-10-CM

## 2023-11-03 PROCEDURE — 87653 STREP B DNA AMP PROBE: CPT | Mod: ORL | Performed by: OBSTETRICS & GYNECOLOGY

## 2023-11-04 LAB — GP B STREP DNA SPEC QL NAA+PROBE: NEGATIVE

## 2023-11-27 DIAGNOSIS — Z01.812 PRE-OPERATIVE LABORATORY EXAMINATION: Primary | ICD-10-CM

## 2023-11-27 LAB
ABO/RH(D): NORMAL
ANTIBODY SCREEN: NEGATIVE
SPECIMEN EXPIRATION DATE: NORMAL

## 2023-11-28 ENCOUNTER — LAB (OUTPATIENT)
Dept: LAB | Facility: CLINIC | Age: 38
End: 2023-11-28
Payer: COMMERCIAL

## 2023-11-28 DIAGNOSIS — Z01.812 PRE-OPERATIVE LABORATORY EXAMINATION: ICD-10-CM

## 2023-11-28 LAB
HGB BLD-MCNC: 14 G/DL (ref 11.7–15.7)
T PALLIDUM AB SER QL: NONREACTIVE

## 2023-11-28 PROCEDURE — 86850 RBC ANTIBODY SCREEN: CPT

## 2023-11-28 PROCEDURE — 85018 HEMOGLOBIN: CPT

## 2023-11-28 PROCEDURE — 36415 COLL VENOUS BLD VENIPUNCTURE: CPT

## 2023-11-28 PROCEDURE — 86901 BLOOD TYPING SEROLOGIC RH(D): CPT

## 2023-11-28 PROCEDURE — 86780 TREPONEMA PALLIDUM: CPT

## 2023-11-30 ENCOUNTER — HOSPITAL ENCOUNTER (INPATIENT)
Facility: CLINIC | Age: 38
LOS: 2 days | Discharge: HOME | End: 2023-12-02
Attending: OBSTETRICS & GYNECOLOGY | Admitting: OBSTETRICS & GYNECOLOGY
Payer: COMMERCIAL

## 2023-11-30 ENCOUNTER — ANESTHESIA EVENT (OUTPATIENT)
Dept: OBGYN | Facility: CLINIC | Age: 38
End: 2023-11-30
Payer: COMMERCIAL

## 2023-11-30 ENCOUNTER — ANESTHESIA (OUTPATIENT)
Dept: OBGYN | Facility: CLINIC | Age: 38
End: 2023-11-30
Payer: COMMERCIAL

## 2023-11-30 DIAGNOSIS — Z98.891 S/P C-SECTION: Primary | ICD-10-CM

## 2023-11-30 PROCEDURE — 370N000017 HC ANESTHESIA TECHNICAL FEE, PER MIN: Performed by: OBSTETRICS & GYNECOLOGY

## 2023-11-30 PROCEDURE — 250N000011 HC RX IP 250 OP 636: Performed by: ANESTHESIOLOGY

## 2023-11-30 PROCEDURE — 250N000009 HC RX 250: Performed by: NURSE ANESTHETIST, CERTIFIED REGISTERED

## 2023-11-30 PROCEDURE — 120N000012 HC R&B POSTPARTUM

## 2023-11-30 PROCEDURE — 258N000003 HC RX IP 258 OP 636: Performed by: OBSTETRICS & GYNECOLOGY

## 2023-11-30 PROCEDURE — 258N000003 HC RX IP 258 OP 636

## 2023-11-30 PROCEDURE — 250N000013 HC RX MED GY IP 250 OP 250 PS 637

## 2023-11-30 PROCEDURE — 250N000011 HC RX IP 250 OP 636: Performed by: OBSTETRICS & GYNECOLOGY

## 2023-11-30 PROCEDURE — 250N000013 HC RX MED GY IP 250 OP 250 PS 637: Performed by: OBSTETRICS & GYNECOLOGY

## 2023-11-30 PROCEDURE — 360N000076 HC SURGERY LEVEL 3, PER MIN: Performed by: OBSTETRICS & GYNECOLOGY

## 2023-11-30 PROCEDURE — 710N000010 HC RECOVERY PHASE 1, LEVEL 2, PER MIN: Performed by: OBSTETRICS & GYNECOLOGY

## 2023-11-30 PROCEDURE — 250N000011 HC RX IP 250 OP 636: Mod: JZ

## 2023-11-30 PROCEDURE — 272N000001 HC OR GENERAL SUPPLY STERILE: Performed by: OBSTETRICS & GYNECOLOGY

## 2023-11-30 PROCEDURE — 250N000011 HC RX IP 250 OP 636: Mod: JZ | Performed by: NURSE ANESTHETIST, CERTIFIED REGISTERED

## 2023-11-30 RX ORDER — MISOPROSTOL 200 UG/1
800 TABLET ORAL
Status: DISCONTINUED | OUTPATIENT
Start: 2023-11-30 | End: 2023-11-30 | Stop reason: HOSPADM

## 2023-11-30 RX ORDER — ACETAMINOPHEN 325 MG/1
TABLET ORAL
Status: COMPLETED
Start: 2023-11-30 | End: 2023-11-30

## 2023-11-30 RX ORDER — BUPIVACAINE HYDROCHLORIDE 7.5 MG/ML
1.6 INJECTION, SOLUTION EPIDURAL; RETROBULBAR ONCE
Status: DISCONTINUED | OUTPATIENT
Start: 2023-11-30 | End: 2023-11-30 | Stop reason: HOSPADM

## 2023-11-30 RX ORDER — ACETAMINOPHEN 325 MG/1
975 TABLET ORAL ONCE
Status: COMPLETED | OUTPATIENT
Start: 2023-11-30 | End: 2023-11-30

## 2023-11-30 RX ORDER — CARBOPROST TROMETHAMINE 250 UG/ML
250 INJECTION, SOLUTION INTRAMUSCULAR
Status: DISCONTINUED | OUTPATIENT
Start: 2023-11-30 | End: 2023-12-02 | Stop reason: HOSPADM

## 2023-11-30 RX ORDER — METHYLERGONOVINE MALEATE 0.2 MG/ML
200 INJECTION INTRAVENOUS
Status: DISCONTINUED | OUTPATIENT
Start: 2023-11-30 | End: 2023-12-02 | Stop reason: HOSPADM

## 2023-11-30 RX ORDER — TRANEXAMIC ACID 10 MG/ML
1 INJECTION, SOLUTION INTRAVENOUS EVERY 30 MIN PRN
Status: DISCONTINUED | OUTPATIENT
Start: 2023-11-30 | End: 2023-11-30 | Stop reason: HOSPADM

## 2023-11-30 RX ORDER — MORPHINE SULFATE 1 MG/ML
200 INJECTION, SOLUTION EPIDURAL; INTRATHECAL; INTRAVENOUS ONCE
Status: DISCONTINUED | OUTPATIENT
Start: 2023-11-30 | End: 2023-11-30 | Stop reason: HOSPADM

## 2023-11-30 RX ORDER — NALOXONE HYDROCHLORIDE 0.4 MG/ML
0.2 INJECTION, SOLUTION INTRAMUSCULAR; INTRAVENOUS; SUBCUTANEOUS
Status: DISCONTINUED | OUTPATIENT
Start: 2023-11-30 | End: 2023-12-02 | Stop reason: HOSPADM

## 2023-11-30 RX ORDER — OXYTOCIN/0.9 % SODIUM CHLORIDE 30/500 ML
340 PLASTIC BAG, INJECTION (ML) INTRAVENOUS CONTINUOUS PRN
Status: DISCONTINUED | OUTPATIENT
Start: 2023-11-30 | End: 2023-12-02 | Stop reason: HOSPADM

## 2023-11-30 RX ORDER — PROCHLORPERAZINE MALEATE 10 MG
10 TABLET ORAL EVERY 6 HOURS PRN
Status: DISCONTINUED | OUTPATIENT
Start: 2023-11-30 | End: 2023-12-02 | Stop reason: HOSPADM

## 2023-11-30 RX ORDER — NALOXONE HYDROCHLORIDE 0.4 MG/ML
0.4 INJECTION, SOLUTION INTRAMUSCULAR; INTRAVENOUS; SUBCUTANEOUS
Status: DISCONTINUED | OUTPATIENT
Start: 2023-11-30 | End: 2023-12-02 | Stop reason: HOSPADM

## 2023-11-30 RX ORDER — CEFAZOLIN SODIUM/WATER 2 G/20 ML
2 SYRINGE (ML) INTRAVENOUS
Status: COMPLETED | OUTPATIENT
Start: 2023-11-30 | End: 2023-11-30

## 2023-11-30 RX ORDER — OXYTOCIN/0.9 % SODIUM CHLORIDE 30/500 ML
340 PLASTIC BAG, INJECTION (ML) INTRAVENOUS CONTINUOUS PRN
Status: DISCONTINUED | OUTPATIENT
Start: 2023-11-30 | End: 2023-11-30 | Stop reason: HOSPADM

## 2023-11-30 RX ORDER — KETOROLAC TROMETHAMINE 30 MG/ML
INJECTION, SOLUTION INTRAMUSCULAR; INTRAVENOUS PRN
Status: DISCONTINUED | OUTPATIENT
Start: 2023-11-30 | End: 2023-11-30

## 2023-11-30 RX ORDER — MODIFIED LANOLIN
OINTMENT (GRAM) TOPICAL
Status: DISCONTINUED | OUTPATIENT
Start: 2023-11-30 | End: 2023-12-02 | Stop reason: HOSPADM

## 2023-11-30 RX ORDER — FENTANYL CITRATE-0.9 % NACL/PF 10 MCG/ML
100 PLASTIC BAG, INJECTION (ML) INTRAVENOUS EVERY 5 MIN PRN
Status: DISCONTINUED | OUTPATIENT
Start: 2023-11-30 | End: 2023-11-30 | Stop reason: HOSPADM

## 2023-11-30 RX ORDER — CEFAZOLIN SODIUM/WATER 2 G/20 ML
2 SYRINGE (ML) INTRAVENOUS SEE ADMIN INSTRUCTIONS
Status: DISCONTINUED | OUTPATIENT
Start: 2023-11-30 | End: 2023-11-30 | Stop reason: HOSPADM

## 2023-11-30 RX ORDER — DIPHENHYDRAMINE HCL 25 MG
25 CAPSULE ORAL EVERY 6 HOURS PRN
Status: DISCONTINUED | OUTPATIENT
Start: 2023-11-30 | End: 2023-12-02 | Stop reason: HOSPADM

## 2023-11-30 RX ORDER — OXYTOCIN 10 [USP'U]/ML
10 INJECTION, SOLUTION INTRAMUSCULAR; INTRAVENOUS
Status: DISCONTINUED | OUTPATIENT
Start: 2023-11-30 | End: 2023-12-02 | Stop reason: HOSPADM

## 2023-11-30 RX ORDER — BISACODYL 10 MG
10 SUPPOSITORY, RECTAL RECTAL DAILY PRN
Status: DISCONTINUED | OUTPATIENT
Start: 2023-12-02 | End: 2023-12-02 | Stop reason: HOSPADM

## 2023-11-30 RX ORDER — CITRIC ACID/SODIUM CITRATE 334-500MG
SOLUTION, ORAL ORAL
Status: COMPLETED
Start: 2023-11-30 | End: 2023-11-30

## 2023-11-30 RX ORDER — DIPHENHYDRAMINE HYDROCHLORIDE 50 MG/ML
25 INJECTION INTRAMUSCULAR; INTRAVENOUS EVERY 6 HOURS PRN
Status: DISCONTINUED | OUTPATIENT
Start: 2023-11-30 | End: 2023-12-02 | Stop reason: HOSPADM

## 2023-11-30 RX ORDER — HYDROCORTISONE 25 MG/G
CREAM TOPICAL 3 TIMES DAILY PRN
Status: DISCONTINUED | OUTPATIENT
Start: 2023-11-30 | End: 2023-12-02 | Stop reason: HOSPADM

## 2023-11-30 RX ORDER — SODIUM CHLORIDE, SODIUM LACTATE, POTASSIUM CHLORIDE, CALCIUM CHLORIDE 600; 310; 30; 20 MG/100ML; MG/100ML; MG/100ML; MG/100ML
INJECTION, SOLUTION INTRAVENOUS CONTINUOUS
Status: DISCONTINUED | OUTPATIENT
Start: 2023-11-30 | End: 2023-11-30 | Stop reason: HOSPADM

## 2023-11-30 RX ORDER — MORPHINE SULFATE 1 MG/ML
INJECTION, SOLUTION EPIDURAL; INTRATHECAL; INTRAVENOUS
Status: COMPLETED | OUTPATIENT
Start: 2023-11-30 | End: 2023-11-30

## 2023-11-30 RX ORDER — ONDANSETRON 2 MG/ML
4 INJECTION INTRAMUSCULAR; INTRAVENOUS EVERY 6 HOURS PRN
Status: DISCONTINUED | OUTPATIENT
Start: 2023-11-30 | End: 2023-11-30 | Stop reason: HOSPADM

## 2023-11-30 RX ORDER — KETOROLAC TROMETHAMINE 30 MG/ML
30 INJECTION, SOLUTION INTRAMUSCULAR; INTRAVENOUS EVERY 6 HOURS
Status: COMPLETED | OUTPATIENT
Start: 2023-11-30 | End: 2023-12-01

## 2023-11-30 RX ORDER — OXYTOCIN/0.9 % SODIUM CHLORIDE 30/500 ML
PLASTIC BAG, INJECTION (ML) INTRAVENOUS CONTINUOUS PRN
Status: DISCONTINUED | OUTPATIENT
Start: 2023-11-30 | End: 2023-11-30

## 2023-11-30 RX ORDER — METOCLOPRAMIDE HYDROCHLORIDE 5 MG/ML
10 INJECTION INTRAMUSCULAR; INTRAVENOUS EVERY 6 HOURS PRN
Status: DISCONTINUED | OUTPATIENT
Start: 2023-11-30 | End: 2023-12-02 | Stop reason: HOSPADM

## 2023-11-30 RX ORDER — ONDANSETRON 4 MG/1
4 TABLET, ORALLY DISINTEGRATING ORAL EVERY 6 HOURS PRN
Status: DISCONTINUED | OUTPATIENT
Start: 2023-11-30 | End: 2023-11-30 | Stop reason: HOSPADM

## 2023-11-30 RX ORDER — AMOXICILLIN 250 MG
1 CAPSULE ORAL 2 TIMES DAILY
Status: DISCONTINUED | OUTPATIENT
Start: 2023-11-30 | End: 2023-12-02 | Stop reason: HOSPADM

## 2023-11-30 RX ORDER — SIMETHICONE 80 MG
80 TABLET,CHEWABLE ORAL 4 TIMES DAILY PRN
Status: DISCONTINUED | OUTPATIENT
Start: 2023-11-30 | End: 2023-12-02 | Stop reason: HOSPADM

## 2023-11-30 RX ORDER — LIDOCAINE 40 MG/G
CREAM TOPICAL
Status: DISCONTINUED | OUTPATIENT
Start: 2023-11-30 | End: 2023-11-30 | Stop reason: HOSPADM

## 2023-11-30 RX ORDER — LIDOCAINE 40 MG/G
CREAM TOPICAL
Status: DISCONTINUED | OUTPATIENT
Start: 2023-11-30 | End: 2023-12-02 | Stop reason: HOSPADM

## 2023-11-30 RX ORDER — ONDANSETRON 4 MG/1
4 TABLET, ORALLY DISINTEGRATING ORAL EVERY 6 HOURS PRN
Status: DISCONTINUED | OUTPATIENT
Start: 2023-11-30 | End: 2023-12-02 | Stop reason: HOSPADM

## 2023-11-30 RX ORDER — IBUPROFEN 400 MG/1
800 TABLET, FILM COATED ORAL EVERY 6 HOURS
Status: DISCONTINUED | OUTPATIENT
Start: 2023-12-01 | End: 2023-12-02 | Stop reason: HOSPADM

## 2023-11-30 RX ORDER — CITRIC ACID/SODIUM CITRATE 334-500MG
30 SOLUTION, ORAL ORAL
Status: DISCONTINUED | OUTPATIENT
Start: 2023-11-30 | End: 2023-11-30 | Stop reason: HOSPADM

## 2023-11-30 RX ORDER — ACETAMINOPHEN 325 MG/1
975 TABLET ORAL EVERY 6 HOURS
Status: DISCONTINUED | OUTPATIENT
Start: 2023-11-30 | End: 2023-12-02 | Stop reason: HOSPADM

## 2023-11-30 RX ORDER — CITRIC ACID/SODIUM CITRATE 334-500MG
30 SOLUTION, ORAL ORAL ONCE
Status: COMPLETED | OUTPATIENT
Start: 2023-11-30 | End: 2023-11-30

## 2023-11-30 RX ORDER — TRANEXAMIC ACID 10 MG/ML
1 INJECTION, SOLUTION INTRAVENOUS EVERY 30 MIN PRN
Status: DISCONTINUED | OUTPATIENT
Start: 2023-11-30 | End: 2023-12-02 | Stop reason: HOSPADM

## 2023-11-30 RX ORDER — MISOPROSTOL 200 UG/1
400 TABLET ORAL
Status: DISCONTINUED | OUTPATIENT
Start: 2023-11-30 | End: 2023-11-30 | Stop reason: HOSPADM

## 2023-11-30 RX ORDER — DEXTROSE, SODIUM CHLORIDE, SODIUM LACTATE, POTASSIUM CHLORIDE, AND CALCIUM CHLORIDE 5; .6; .31; .03; .02 G/100ML; G/100ML; G/100ML; G/100ML; G/100ML
INJECTION, SOLUTION INTRAVENOUS CONTINUOUS
Status: DISCONTINUED | OUTPATIENT
Start: 2023-11-30 | End: 2023-12-02 | Stop reason: HOSPADM

## 2023-11-30 RX ORDER — CEFAZOLIN SODIUM/WATER 2 G/20 ML
SYRINGE (ML) INTRAVENOUS
Status: DISCONTINUED
Start: 2023-11-30 | End: 2023-11-30 | Stop reason: HOSPADM

## 2023-11-30 RX ORDER — MISOPROSTOL 200 UG/1
400 TABLET ORAL
Status: DISCONTINUED | OUTPATIENT
Start: 2023-11-30 | End: 2023-12-02 | Stop reason: HOSPADM

## 2023-11-30 RX ORDER — AMOXICILLIN 250 MG
2 CAPSULE ORAL 2 TIMES DAILY
Status: DISCONTINUED | OUTPATIENT
Start: 2023-11-30 | End: 2023-12-02 | Stop reason: HOSPADM

## 2023-11-30 RX ORDER — CARBOPROST TROMETHAMINE 250 UG/ML
250 INJECTION, SOLUTION INTRAMUSCULAR
Status: DISCONTINUED | OUTPATIENT
Start: 2023-11-30 | End: 2023-11-30 | Stop reason: HOSPADM

## 2023-11-30 RX ORDER — METHYLERGONOVINE MALEATE 0.2 MG/ML
200 INJECTION INTRAVENOUS
Status: DISCONTINUED | OUTPATIENT
Start: 2023-11-30 | End: 2023-11-30 | Stop reason: HOSPADM

## 2023-11-30 RX ORDER — DEXAMETHASONE SODIUM PHOSPHATE 4 MG/ML
INJECTION, SOLUTION INTRA-ARTICULAR; INTRALESIONAL; INTRAMUSCULAR; INTRAVENOUS; SOFT TISSUE PRN
Status: DISCONTINUED | OUTPATIENT
Start: 2023-11-30 | End: 2023-11-30

## 2023-11-30 RX ORDER — OXYCODONE HYDROCHLORIDE 5 MG/1
5 TABLET ORAL EVERY 4 HOURS PRN
Status: DISCONTINUED | OUTPATIENT
Start: 2023-11-30 | End: 2023-12-02 | Stop reason: HOSPADM

## 2023-11-30 RX ORDER — ONDANSETRON 2 MG/ML
4 INJECTION INTRAMUSCULAR; INTRAVENOUS EVERY 6 HOURS PRN
Status: DISCONTINUED | OUTPATIENT
Start: 2023-11-30 | End: 2023-12-02 | Stop reason: HOSPADM

## 2023-11-30 RX ORDER — ONDANSETRON 2 MG/ML
INJECTION INTRAMUSCULAR; INTRAVENOUS PRN
Status: DISCONTINUED | OUTPATIENT
Start: 2023-11-30 | End: 2023-11-30

## 2023-11-30 RX ORDER — BUPIVACAINE HYDROCHLORIDE 7.5 MG/ML
INJECTION, SOLUTION INTRASPINAL
Status: COMPLETED | OUTPATIENT
Start: 2023-11-30 | End: 2023-11-30

## 2023-11-30 RX ORDER — OXYTOCIN 10 [USP'U]/ML
10 INJECTION, SOLUTION INTRAMUSCULAR; INTRAVENOUS
Status: DISCONTINUED | OUTPATIENT
Start: 2023-11-30 | End: 2023-11-30 | Stop reason: HOSPADM

## 2023-11-30 RX ORDER — MISOPROSTOL 200 UG/1
800 TABLET ORAL
Status: DISCONTINUED | OUTPATIENT
Start: 2023-11-30 | End: 2023-12-02 | Stop reason: HOSPADM

## 2023-11-30 RX ORDER — PROCHLORPERAZINE 25 MG
25 SUPPOSITORY, RECTAL RECTAL EVERY 12 HOURS PRN
Status: DISCONTINUED | OUTPATIENT
Start: 2023-11-30 | End: 2023-12-02 | Stop reason: HOSPADM

## 2023-11-30 RX ORDER — OXYTOCIN/0.9 % SODIUM CHLORIDE 30/500 ML
100-340 PLASTIC BAG, INJECTION (ML) INTRAVENOUS CONTINUOUS PRN
Status: DISCONTINUED | OUTPATIENT
Start: 2023-11-30 | End: 2023-12-02 | Stop reason: HOSPADM

## 2023-11-30 RX ORDER — METOCLOPRAMIDE 10 MG/1
10 TABLET ORAL EVERY 6 HOURS PRN
Status: DISCONTINUED | OUTPATIENT
Start: 2023-11-30 | End: 2023-12-02 | Stop reason: HOSPADM

## 2023-11-30 RX ADMIN — Medication 2 G: at 13:01

## 2023-11-30 RX ADMIN — KETOROLAC TROMETHAMINE 30 MG: 30 INJECTION, SOLUTION INTRAMUSCULAR at 14:18

## 2023-11-30 RX ADMIN — Medication 30 ML: at 12:59

## 2023-11-30 RX ADMIN — KETOROLAC TROMETHAMINE 30 MG: 30 INJECTION, SOLUTION INTRAMUSCULAR; INTRAVENOUS at 20:00

## 2023-11-30 RX ADMIN — Medication 340 ML/HR: at 13:41

## 2023-11-30 RX ADMIN — ONDANSETRON 4 MG: 2 INJECTION INTRAMUSCULAR; INTRAVENOUS at 13:04

## 2023-11-30 RX ADMIN — SODIUM CHLORIDE, POTASSIUM CHLORIDE, SODIUM LACTATE AND CALCIUM CHLORIDE: 600; 310; 30; 20 INJECTION, SOLUTION INTRAVENOUS at 12:37

## 2023-11-30 RX ADMIN — MORPHINE SULFATE 0.15 MG: 1 INJECTION, SOLUTION EPIDURAL; INTRATHECAL; INTRAVENOUS at 13:06

## 2023-11-30 RX ADMIN — OXYCODONE HYDROCHLORIDE 5 MG: 5 TABLET ORAL at 18:46

## 2023-11-30 RX ADMIN — ACETAMINOPHEN 975 MG: 325 TABLET, FILM COATED ORAL at 17:59

## 2023-11-30 RX ADMIN — SODIUM CHLORIDE, POTASSIUM CHLORIDE, SODIUM LACTATE AND CALCIUM CHLORIDE: 600; 310; 30; 20 INJECTION, SOLUTION INTRAVENOUS at 14:02

## 2023-11-30 RX ADMIN — DOCUSATE SODIUM 50 MG AND SENNOSIDES 8.6 MG 1 TABLET: 8.6; 5 TABLET, FILM COATED ORAL at 20:00

## 2023-11-30 RX ADMIN — ACETAMINOPHEN 975 MG: 325 TABLET ORAL at 12:07

## 2023-11-30 RX ADMIN — DEXAMETHASONE SODIUM PHOSPHATE 4 MG: 4 INJECTION, SOLUTION INTRA-ARTICULAR; INTRALESIONAL; INTRAMUSCULAR; INTRAVENOUS; SOFT TISSUE at 13:37

## 2023-11-30 RX ADMIN — BUPIVACAINE HYDROCHLORIDE IN DEXTROSE 1.6 ML: 7.5 INJECTION, SOLUTION SUBARACHNOID at 13:06

## 2023-11-30 RX ADMIN — ACETAMINOPHEN 975 MG: 325 TABLET, FILM COATED ORAL at 12:07

## 2023-11-30 RX ADMIN — SODIUM CHLORIDE, SODIUM LACTATE, POTASSIUM CHLORIDE, CALCIUM CHLORIDE AND DEXTROSE MONOHYDRATE: 5; 600; 310; 30; 20 INJECTION, SOLUTION INTRAVENOUS at 17:07

## 2023-11-30 RX ADMIN — SODIUM CITRATE AND CITRIC ACID MONOHYDRATE 30 ML: 500; 334 SOLUTION ORAL at 12:59

## 2023-11-30 RX ADMIN — PHENYLEPHRINE HYDROCHLORIDE 0.4 MCG/KG/MIN: 10 INJECTION INTRAVENOUS at 13:11

## 2023-11-30 ASSESSMENT — ACTIVITIES OF DAILY LIVING (ADL)
ADLS_ACUITY_SCORE: 18
ADLS_ACUITY_SCORE: 21

## 2023-11-30 NOTE — ANESTHESIA POSTPROCEDURE EVALUATION
Patient: Alena Hall    Procedure: Procedure(s):  REPEAT  section       Anesthesia Type:  Spinal    Note:  Disposition: Inpatient   Postop Pain Control: Uneventful            Sign Out: Well controlled pain   PONV: No   Neuro/Psych: Uneventful            Sign Out: Acceptable/Baseline neuro status   Airway/Respiratory: Uneventful            Sign Out: Acceptable/Baseline resp. status   CV/Hemodynamics: Uneventful            Sign Out: Acceptable CV status; No obvious hypovolemia; No obvious fluid overload   Other NRE: NONE   DID A NON-ROUTINE EVENT OCCUR? No           Last vitals:  Vitals Value Taken Time   /66 23 1545   Temp 36  C (96.8  F) 23 1445   Pulse 62 23 1556   Resp 14 23 1556   SpO2 96 % 23 1556   Vitals shown include unfiled device data.    Electronically Signed By: Bryan Farrell DO  2023  3:58 PM

## 2023-11-30 NOTE — ANESTHESIA PREPROCEDURE EVALUATION
Anesthesia Pre-Procedure Evaluation    Patient: Alena Hall   MRN: [id number] : 1985        Procedure : Procedure(s):  REPEAT  section          Past Medical History:   Diagnosis Date    Anxiety     Depression 2012    on meds    Homozygous MTHFR mutation C677T 2013    normal homocystein level      Past Surgical History:   Procedure Laterality Date     SECTION N/A 2020    Procedure:  SECTION;  Surgeon: Chana Chun MD;  Location:  [facility]    DILATION AND CURETTAGE SUCTION N/A 2023    Procedure: DILATION AND CURETTAGE, UTERUS, USING SUCTION with ultrasound guidance;  Surgeon: Thalia Luis MD;  Location: [facility] OR      No Known Allergies   Social History     Tobacco Use    Smoking status: Never    Smokeless tobacco: Not on file   Substance Use Topics    Alcohol use: Not on file      Wt Readings from Last 1 Encounters:   23 105 kg (231 lb 6.4 oz)        Anesthesia Evaluation            ROS/MED HX  ENT/Pulmonary:    (-) asthma   Neurologic:  - neg neurologic ROS     Cardiovascular:    (-) PIH   METS/Exercise Tolerance:     Hematologic:     (+)  no anticoagulation therapy, no coagulopathy,             Musculoskeletal:       GI/Hepatic:     (+) GERD,                   Renal/Genitourinary:       Endo:     (+)               Obesity,       Psychiatric/Substance Use:     (+) psychiatric history anxiety and depression       Infectious Disease:       Malignancy:       Other:      (+)  , ,previous          Physical Exam    Airway        Mallampati: II   TM distance: > 3 FB   Neck ROM: full     Respiratory Devices and Support         Dental  no notable dental history         Cardiovascular   cardiovascular exam normal          Pulmonary   pulmonary exam normal                OUTSIDE LABS:  CBC:   Lab Results   Component Value Date    WBC 11.8 (H) 2023    WBC 12.6 (H) 2023    HGB 14.0 2023    HGB 13.4 2023    HCT 40.3  "09/08/2023    HCT 44.1 05/04/2023     09/08/2023     05/04/2023     BMP: No results found for: \"NA\", \"POTASSIUM\", \"CHLORIDE\", \"CO2\", \"BUN\", \"CR\", \"GLC\"  COAGS:   Lab Results   Component Value Date    PTT 31 12/26/2013    INR 1.0 12/26/2013     POC: No results found for: \"BGM\", \"HCG\", \"HCGS\"  HEPATIC: No results found for: \"ALBUMIN\", \"PROTTOTAL\", \"ALT\", \"AST\", \"GGT\", \"ALKPHOS\", \"BILITOTAL\", \"BILIDIRECT\", \"AD\"  OTHER:   Lab Results   Component Value Date    A1C 5.5 12/05/2022    TSH 2.70 03/08/2023       Anesthesia Plan    ASA Status:  2       Anesthesia Type: Spinal.              Consents    Anesthesia Plan(s) and associated risks, benefits, and realistic alternatives discussed. Questions answered and patient/representative(s) expressed understanding.     - Discussed:     - Discussed with:  Patient            Postoperative Care            Comments:               Bryan Farrell,     I have reviewed the pertinent notes and labs in the chart from the past 30 days and (re)examined the patient.  Any updates or changes from those notes are reflected in this note.                  " Adjacent Tissue Transfer Text: The defect edges were debeveled with a #15 scalpel blade.  Given the location of the defect and the proximity to free margins an adjacent tissue transfer was deemed most appropriate.  Using a sterile surgical marker, an appropriate flap was drawn incorporating the defect and placing the expected incisions within the relaxed skin tension lines where possible.    The area thus outlined was incised deep to adipose tissue with a #15 scalpel blade.  The skin margins were undermined to an appropriate distance in all directions utilizing iris scissors.

## 2023-11-30 NOTE — ANESTHESIA CARE TRANSFER NOTE
Patient: Alena Hall    Procedure: Procedure(s):  REPEAT  section       Diagnosis: Previous  delivery affecting pregnancy [O34.219]  Diagnosis Additional Information: No value filed.    Anesthesia Type:   Spinal     Note:    Oropharynx: oropharynx clear of all foreign objects and spontaneously breathing  Level of Consciousness: awake  Oxygen Supplementation: room air    Independent Airway: airway patency satisfactory and stable  Dentition: dentition unchanged  Vital Signs Stable: post-procedure vital signs reviewed and stable  Report to RN Given: handoff report given  Patient transferred to: Labor and Delivery  Comments: Pt to OB PACU on room air, airway patent, VSS. Report to RN.  Handoff Report: Identifed the Patient, Identified the Reponsible Provider, Reviewed the pertinent medical history, Discussed the surgical course, Reviewed Intra-OP anesthesia mangement and issues during anesthesia, Set expectations for post-procedure period and Allowed opportunity for questions and acknowledgement of understanding  Vitals:  Vitals Value Taken Time   BP     Temp     Pulse     Resp     SpO2         Electronically Signed By: SIRISHA Sanz CRNA  2023  2:33 PM

## 2023-11-30 NOTE — PLAN OF CARE
Data: Alena Hall transferred to [id number] via cart at 1650. Baby transferred via parent's arms.  Action: Receiving unit notified of transfer: Yes. Patient and family notified of room change. Report given to Mariza GARCIA RN at 1650. Belongings sent to receiving unit. Accompanied by Registered Nurse. Oriented patient to surroundings. Call light within reach. ID bands double-checked with receiving RN.  Response: Patient tolerated transfer and is stable.

## 2023-11-30 NOTE — PLAN OF CARE
Pt VSS, afebrile.  States no pain at this time.  Minimal amount of rubra, some shadowing noted on the dressing, marked.  FF@U.  Denies nausea.  IV infusing with out difficulty.  SO at bedside and supportive.

## 2023-11-30 NOTE — PLAN OF CARE
Pt arrived for scheduled  section. Pt is a  at 40.2 weeks here for scheduled  section.  Pt states she has had an uncomplicated pregnancy, prenatal reviewed.  Monitors applied after obtaining verbal consent.  Pt and spouse oriented to room, call light given. SO at bedside, supportive.  IV started. Admission complete.  All questions answered.

## 2023-11-30 NOTE — BRIEF OP NOTE
[facility] Brief Operative Note    Pre-operative diagnosis: IUP @ 40.2   Previous  delivery affecting pregnancy [O34.219]  Maternal obesity   Post-operative diagnosis Same   Procedure: Procedure(s):  REPEAT  section   Surgeon(s): Surgeon(s) and Role:     * Savanna Link MD - Primary   Estimated blood loss: 440 mL    Specimens: ID Type Source Tests Collected by Time Destination   A :  Tissue Umbilical Cord SEE PROVIDERS ORDERS Savanna Link MD 2023  1:47 PM    B :  Cord blood Umbilical Cord OR DOCUMENTATION ONLY Savanna Link MD 2023  1:47 PM       Findings: Vigorous viable male infant, weight 8+6, Apgars 9 & 9  Dense subcutaneous adhesions  Adhesions of bladder to lower uterine segment  Normal PP anatomy    Savanna Link MD

## 2023-11-30 NOTE — OP NOTE
[facility]  Obstetrics Operative Report    Surgery Date:  2023  Surgeon(s): Savanna Link MD  Assistants:  None    Preoperative Diagnoses:  Intrauterine pregnancy at 40w2d  Previous  delivery  Maternal obesity    Postoperative diagnoses: Same     Procedure performed:  Repeat low segment transverse  section     Anesthesia:  Spinal with duramorph  Est Blood Loss (mL): 440 mL  Fluid replacement: 1,100 mL lactated Ringer's.   Urine output: 200  Specimens: Cord blood to lab  Complications: None apparent    Operative findings:   1. Single viable male infant at 13;39 hours on 23. Apgars of 9 and 9 at one and five minutes.  Birth weight:: 8 lbs 6 oz.  Fetal presentation: Vertex OA. Amniotic fluid: clear. Placenta intact with 3 vessel cord.    2.Normal appearing uterus, fallopian tubes, ovaries.    3. Mild intraabdominal adhesions.  Dense subcutaneous and fascial adhesions      Indication: Alena Hall is a 38 year old, , who was admitted at 40w2d for scheduled repeat  delivery.  The risks, benefits, and alternatives of  delivery were explained and the patient agreed to proceed.     Procedure details:  After obtaining informed consent the patient was taken to the operating room. A safety patient time out was performed prior to the procedure. SCD's were placed. The patient received spinal anesthesia with duramoprh prior to the skin incision. A bliss was placed. She was placed in the dorsal supine position with a leftward tilt and prepped and draped in the usual sterile fashion. Nursing staff were present and available for baby.     Following test of adequate anesthesia, the abdomen was entered through a Pfannenstiel skin incision through her previous scar. The skin incision was made sharply and carried through the subcutaneous tissue to the fascia.  Dense adhesions were encountered, requiring significant sharp dissection and careful attention to hemostasis of  perforating vessels. Fascia was incised in the midline and extended laterally with the Elizalde scissors with some difficulty due to dense fascia.  The superior margin of the fascial incision was grasped with Kocher clamps and dissected from the underlying muscle sharp and blunt dissecton, which was then repeated at the lower margin of the fascial incision.  The muscle was adherent in the midline, and a hemostat was used to separate it in the midline.  The peritoneum was entered sharply and the opening extended by digital dissection with care to avoid the bladder. A large Deng retractor was placed. The vesicouterine peritoneum was entered sharply with Metzenbaum scissors and incision extended laterally. The bladder flap was created digitally with mild adhesion of the bladder noted.     The lower segment of the uterus was opened sharply in a transverse fashion and extended with digital pressure. The infant was in the vertex OA presentation. The head was elevated to the level of the hysterotomy and delivered atraumatically. No nuchal cord was noted and male infant delivered through this. The remainder of the infant delivered without issue.    Oxytocin was given through the running IV. The cord was doubly clamped after delayed cord clamping of 60 seconds and cut and the infant was handed off to the waiting nursing staff. A segment of the cord was cut and set aside for cord gases if needed. Cord blood was obtained as per protocol.     The placenta was extracted manually.  The uterus was cleared of all clots and debris.  The uterus was massaged and was noted to be firm.   With vigorous massage as well as administration of oxytocin, good uterine tone was achieved. The hysterotomy was repaired with 0-vicryl suture in a running locked fashion. A 2nd layer of 0-monocryl in a running, non-locked fashion was used to imbricate the incision and good hemostasis was achieved. A single figure of 8 suture was placed over the mid  portion of the incision for hemostasis. The bladder flap was inspected and found to be hemostatic. The bilateral pericolic gutters were cleared of all clots.  The hysterotomy was again inspected and found to have no active sites of bleeding.  The fascia and rectus muscles were examined and found to have no active sites of bleeding.      The fascia was closed with a running suture of 0-vicryl.  Subcutaneous tissue was irrigated. Areas that were oozing were controlled with cautery. The subcutaneous tissue was  reapproximated with 3-0 vicryl suture. The skin was closed with 4-0 monocryl. Steri strips and an island dressing were placed.    The patient tolerated the procedure well and was taken to the recovery room in stable condition. All sponge, needle and instrument counts were correct x2.     Savanna Link MD   [facility]  11/30/2023 2:24 PM

## 2023-11-30 NOTE — ANESTHESIA PROCEDURE NOTES
"Lumbar puncture Procedure Note    Pre-Procedure   Staff -        Anesthesiologist:  Bryan Farrell DO       Performed By: anesthesiologist       Location: OB       Pre-Anesthestic Checklist: patient identified, IV checked, risks and benefits discussed, informed consent, monitors and equipment checked, pre-op evaluation, at physician/surgeon's request and post-op pain management  Timeout:       Correct Patient: Yes        Correct Procedure: Yes        Correct Site: Yes        Correct Position: Yes   Procedure Documentation  Procedure: lumbar puncture       Patient Position: sitting       Skin prep: Chloraprep       Insertion Site: L3-4. (midline approach).       Spinal Needle Type: Nalini tip       Introducer used    Assessment/Narrative         CSF fluid: clear.       Opening pressure was cmH2O while  Sitting.      Medication(s) Administered   0.75% Hyperbaric Bupivacaine (Intrathecal) - Intrathecal   1.6 mL - 11/30/2023 1:06:00 PM  Morphine PF 1 mg/mL (Intrathecal) - Intrathecal   0.15 mg - 11/30/2023 1:06:00 PM   Comments:  Risk benefit alternatives explained. Patient agrees to undergo a spinal. No pain with needle advancement, no paresthesias, one pass.   Procedure done with no issues, no complications. Patient tolerated the procedure well.        FOR [facility] ([facility]) ONLY:   Pain Team Contact information: please page the Pain Team Via [employer]. Search \"Pain\". During daytime hours, please page the attending first. At night please page the resident first.      "

## 2023-11-30 NOTE — PROGRESS NOTES
"OB Brief Admit H&P    No significant change in general health status based on examination of the patient, review of Nursing Admission Database and prenatal record.    Pt is a 38 year old  @ 40w2d who presented to L&D with for scheduled rpt .    Patient's prenatal course has been complicated by:  Maternal obesity  H/o depression & ADHD - no current meds (previously on Lexapro and Vyvanse), has therapist  H/o SAB x 2  H/o  x 1 for fetal intolerance; h/o wound infection post operatively Desired TOLAC if spont labor.    Prenatal Labs:    Blood type O+  Rubella immune    GBS negative      /74   Temp 97.9  F (36.6  C) (Temporal)   Resp 16   Ht 1.753 m (5' 9\")   BMI 34.17 kg/m    EFM:  130, mod del ++ accels, no decel (category 1)  [place]: none  SVE: deferred  Membranes:  intact    Assessment:  38 year old  @ 40w2d admitted for scheduled repeat  delivery.    Plan:  Admit to Labor and Delivery  Patient consented for  section. Risk, benefits, alternatives discussed with patient to include, but not limited to bleeding (blood transfusion risk 1 in 2 million HIV and 1 in 250K Hep C), infection, damage to surrounding organs,  hysterectomy, fetal injury. All questions answered.  Preop antibiotics Ancef    Savanna Link MD  2023  12:53 PM    "

## 2023-12-01 LAB — HGB BLD-MCNC: 11.5 G/DL (ref 11.7–15.7)

## 2023-12-01 PROCEDURE — 2W13X6Z COMPRESSION OF ABDOMINAL WALL USING PRESSURE DRESSING: ICD-10-PCS | Performed by: OBSTETRICS & GYNECOLOGY

## 2023-12-01 PROCEDURE — 36415 COLL VENOUS BLD VENIPUNCTURE: CPT | Performed by: OBSTETRICS & GYNECOLOGY

## 2023-12-01 PROCEDURE — 120N000012 HC R&B POSTPARTUM

## 2023-12-01 PROCEDURE — 85018 HEMOGLOBIN: CPT | Performed by: OBSTETRICS & GYNECOLOGY

## 2023-12-01 PROCEDURE — 250N000013 HC RX MED GY IP 250 OP 250 PS 637: Performed by: OBSTETRICS & GYNECOLOGY

## 2023-12-01 PROCEDURE — 250N000011 HC RX IP 250 OP 636: Mod: JZ | Performed by: OBSTETRICS & GYNECOLOGY

## 2023-12-01 RX ADMIN — DOCUSATE SODIUM 50 MG AND SENNOSIDES 8.6 MG 2 TABLET: 8.6; 5 TABLET, FILM COATED ORAL at 06:48

## 2023-12-01 RX ADMIN — IBUPROFEN 800 MG: 400 TABLET ORAL at 09:07

## 2023-12-01 RX ADMIN — OXYCODONE HYDROCHLORIDE 5 MG: 5 TABLET ORAL at 20:13

## 2023-12-01 RX ADMIN — IBUPROFEN 800 MG: 400 TABLET ORAL at 21:45

## 2023-12-01 RX ADMIN — IBUPROFEN 800 MG: 400 TABLET ORAL at 15:49

## 2023-12-01 RX ADMIN — ACETAMINOPHEN 975 MG: 325 TABLET, FILM COATED ORAL at 18:24

## 2023-12-01 RX ADMIN — ACETAMINOPHEN 975 MG: 325 TABLET, FILM COATED ORAL at 12:11

## 2023-12-01 RX ADMIN — KETOROLAC TROMETHAMINE 30 MG: 30 INJECTION, SOLUTION INTRAMUSCULAR; INTRAVENOUS at 02:41

## 2023-12-01 RX ADMIN — DOCUSATE SODIUM 50 MG AND SENNOSIDES 8.6 MG 2 TABLET: 8.6; 5 TABLET, FILM COATED ORAL at 20:13

## 2023-12-01 RX ADMIN — OXYCODONE HYDROCHLORIDE 5 MG: 5 TABLET ORAL at 06:41

## 2023-12-01 RX ADMIN — ACETAMINOPHEN 975 MG: 325 TABLET, FILM COATED ORAL at 06:41

## 2023-12-01 RX ADMIN — OXYCODONE HYDROCHLORIDE 5 MG: 5 TABLET ORAL at 15:56

## 2023-12-01 RX ADMIN — ACETAMINOPHEN 975 MG: 325 TABLET, FILM COATED ORAL at 00:36

## 2023-12-01 ASSESSMENT — ACTIVITIES OF DAILY LIVING (ADL)
ADLS_ACUITY_SCORE: 18
ADLS_ACUITY_SCORE: 21
ADLS_ACUITY_SCORE: 18
ADLS_ACUITY_SCORE: 18
ADLS_ACUITY_SCORE: 21
ADLS_ACUITY_SCORE: 21
ADLS_ACUITY_SCORE: 18
ADLS_ACUITY_SCORE: 18
ADLS_ACUITY_SCORE: 21
ADLS_ACUITY_SCORE: 18

## 2023-12-01 NOTE — LACTATION NOTE
Initial visit with Mother and Father and baby boy.  Mother states she had planned on pumping and bottle feeding.  With her first child she attempted breast and used the SNS; he had a severe tongue tie so Mother ended up pumping and bottle feeding. She did attempt to put this baby boy to the breast and he breast fed well.  Baby boy has been both breast feeding and bottling similac.  He has been sleepy with feeds since about midnight.  Mother and Father educated on normal  behavior, focusing on normal feeding patterns from birth to day 3 of life.  Encouraged Mother and Father that after baby turns 24 hours old, he should wake up and be more willing to work at breast feeding.      Baby boy due for a feeding at time of visit.  LC reviewed with Mother proper positioning of baby, maternal hand placement, using breast feeding support pillows, and how to help baby achieve a deep latch with feedings.  Reviewed importance of getting a deep latch with feedings versus a shallow latch.  LC assisted mother to get baby latched onto left breast in the cross cradle position.   Baby sleepy with feeding and unable to latch.         Reviewed early milk volumes, hand expression, and how to know baby is getting enough by recording feedings and wet/dirty diapers. Reassured parents that we will monitor infant's weight at 24 hours every night during their stay.  Breast feeding general information reviewed.    Encouraged Mother to call for assistance with latch or positioning if needed.  Appreciative of visit.  No further questions at this time. Will follow as needed.     Mame Somers RN, IBCLC

## 2023-12-01 NOTE — PLAN OF CARE
1700)Pt. admitted from L&D via bed.. Pt. arrived with baby and was accompanied by S/O Bri and arrived with personal belongings. Report was taken from Sally in L&D.    Pt. is A&Ox3 and VSS on RA. Fundus is firm and midline.  Vaginal bleeding is scant.     Pt. c/o 2/10 pain. Pt. denied  nausea, CP, SOB, lightheadedness, or dizziness.   LS clear and BS normo active all 4 quadrants..   PIV patent and infusing.    Camacho patent and draining.    Dressing to lower abdomen Intact, has small amount dried red drainage and dressing is marked/mapped out.  Pneumoboots in place to BLE.    Pt.  & bri Oriented to the room and call light system,family book, pp routine, pain med's, feeding routine.  .Goal Outcome Evaluation:      Plan of Care Reviewed With: patient, significant other    Overall Patient Progress: improvingOverall Patient Progress: improving

## 2023-12-01 NOTE — CARE PLAN
VSS.    Incision dressing site intact, had additional small amount red drainage  which I marked.  Dheeraj reg diet, + flatus.  Incisional pain well controlled with Toradol, Tylenol & Oxycodone 5 mg x 1.   Ambulated to bathroom with assist one for HS, Anum cares.cath cares,brushed teeth & tolerated well.    Urinary urethral cathter drained 900 ml this evening and was removed at 2200.   Breastfeeding  going well to help latch and position.Has history with poor BF with first child who was tongue tied and used nipple shield.   Pt states her milk was slow to come in.Discussed early pimping and breast pump at bedside but declined pumping this stu.Plan to supplement with pumped EBM and/or formula if needed.

## 2023-12-01 NOTE — PLAN OF CARE
Vital signs stable, fundus firm, scant rubra flow, dressing changed. Patient is up ad roxana, voiding adequately, pain well controlled with medications given as prescribed. Encouraged to continue to ambulate as able and void frequently. Patient is bonding well with infant.

## 2023-12-01 NOTE — PLAN OF CARE
VSS.  Working on breastfeeding, formula feeding with bottle. With age appropriate voids and stools. Continue to monitor and notify MD as needed.

## 2023-12-01 NOTE — PROGRESS NOTES
"OB Post-op  Section Progress Note POD# 1    S:  Patient doing well.  Pain well controlled with oral pain medication.  Ambulating.  Tolerating advancing diet.  No N/V.  Passing flatus.  Voiding.  Bleeding is normal.  Breastfeeding.    O:  /56   Pulse 56   Temp 97.9  F (36.6  C) (Axillary)   Resp 18   Ht 1.753 m (5' 9\")   SpO2 98%   Breastfeeding Unknown   BMI 34.17 kg/m    UOP- voiding normal volumes    Gen- A&O, NAD  Abd- soft, non tender, non distended  Fundus- firm, non tender  Incision/Dressing- C/D/I  Dressing changed this morning for moderate saturation, no further drainage.   Ext- non-tender, mild edema.     Hemoglobin   Date Value Ref Range Status   2023 14.0 11.7 - 15.7 g/dL Final   2020 11.8 11.7 - 15.7 g/dL Final     O POS   Rubella immune    A/P:  38 year old  POD# 1 s/p repeat LTCS for previous  section and maternal obesity.  Doing well post op with no apparent complications    1.  Routine post-op cares  2.  Analgesia adequate  3.  Ambulate   4.  Discharge planned in 2 days  5.  The plan of care was discussed with the patient.  She expressed understanding and agreement.   6.  Post operative instructions and indications to call or return were discussed.      Tony Chandler MD  2023  8:35 AM   "

## 2023-12-02 VITALS
HEIGHT: 69 IN | HEART RATE: 65 BPM | WEIGHT: 266.4 LBS | TEMPERATURE: 97.8 F | RESPIRATION RATE: 18 BRPM | SYSTOLIC BLOOD PRESSURE: 127 MMHG | DIASTOLIC BLOOD PRESSURE: 72 MMHG | BODY MASS INDEX: 39.46 KG/M2 | OXYGEN SATURATION: 98 %

## 2023-12-02 PROBLEM — Z98.891 S/P REPEAT LOW TRANSVERSE C-SECTION: Status: RESOLVED | Noted: 2023-11-30 | Resolved: 2023-12-02

## 2023-12-02 PROCEDURE — 250N000013 HC RX MED GY IP 250 OP 250 PS 637: Performed by: OBSTETRICS & GYNECOLOGY

## 2023-12-02 RX ORDER — OXYCODONE HYDROCHLORIDE 5 MG/1
5 TABLET ORAL EVERY 6 HOURS PRN
Qty: 20 TABLET | Refills: 0 | Status: SHIPPED | OUTPATIENT
Start: 2023-12-02

## 2023-12-02 RX ORDER — AMOXICILLIN 250 MG
1 CAPSULE ORAL 2 TIMES DAILY
COMMUNITY
Start: 2023-12-02

## 2023-12-02 RX ADMIN — ACETAMINOPHEN 975 MG: 325 TABLET, FILM COATED ORAL at 00:46

## 2023-12-02 RX ADMIN — DOCUSATE SODIUM 50 MG AND SENNOSIDES 8.6 MG 1 TABLET: 8.6; 5 TABLET, FILM COATED ORAL at 07:55

## 2023-12-02 RX ADMIN — OXYCODONE HYDROCHLORIDE 5 MG: 5 TABLET ORAL at 00:46

## 2023-12-02 RX ADMIN — IBUPROFEN 800 MG: 400 TABLET ORAL at 03:47

## 2023-12-02 RX ADMIN — IBUPROFEN 800 MG: 400 TABLET ORAL at 15:43

## 2023-12-02 RX ADMIN — ACETAMINOPHEN 975 MG: 325 TABLET, FILM COATED ORAL at 06:46

## 2023-12-02 RX ADMIN — ACETAMINOPHEN 975 MG: 325 TABLET, FILM COATED ORAL at 12:58

## 2023-12-02 RX ADMIN — OXYCODONE HYDROCHLORIDE 5 MG: 5 TABLET ORAL at 05:48

## 2023-12-02 RX ADMIN — OXYCODONE HYDROCHLORIDE 5 MG: 5 TABLET ORAL at 15:43

## 2023-12-02 RX ADMIN — OXYCODONE HYDROCHLORIDE 5 MG: 5 TABLET ORAL at 10:15

## 2023-12-02 RX ADMIN — IBUPROFEN 800 MG: 400 TABLET ORAL at 09:31

## 2023-12-02 ASSESSMENT — ACTIVITIES OF DAILY LIVING (ADL)
ADLS_ACUITY_SCORE: 18

## 2023-12-02 NOTE — PLAN OF CARE
Goal Outcome Evaluation:    Vital signs stable. Postpartum assessment WDL. Incision dressing intact. Dressing saturated and patient noted blood on her towel after taking a shower at 2023. See provider notification note. Pressure dressing applied and no drainage coming out of the pressure dressing noted. Pain controlled with tylenol, ibuprofen and oxycodone. Patient ambulating independently. Patient reports passing gas. Patient giving the infant formula via bottle. Patient and infant bonding well. Will continue with current plan of care.

## 2023-12-02 NOTE — PLAN OF CARE
Vital signs stable. Hgb 11.5 today Postpartum assessment WDL. Incision shadow drainage on pt felt warm on incision area  Abdominal binder removed using ice on incision area . Pain controlled with tylenol ,motrin and oxycodone . Patient up ambulating voiding without difficulty Patient passing gas. Working on Breastfeeding, pumping and bottle feeding formula  Patient and infant bonding well. Will continue with current plan of care.

## 2023-12-02 NOTE — DISCHARGE INSTRUCTIONS
Contact your doctor if you have a temperature >100.4F, if you experience heavy bleeding and are soaking through a pad in less than one hour, if you are not able to eat or drink, or have severe abdominal pain.     Nothing in the vagina for 6 weeks post partum. No intercourse, tampons or douching.   Showers are okay. No soaking in a bath, sitz baths for 15-20 min 2x daily are okay.     Please make a follow up appointment in the office in 2 weeks.     Please call the office with chest pain with shortness of breath or blurry vision with a headache, or upper abdominal pain as this could be concerning for preeclampsia (blood pressure problems with pregnancy and post partum).       No heavy lifting over 20 pounds for 6 weeks   If you have increase in drainage or signs of infection (redness, warmth), please be seen.   Postop  Birth Instructions    Activity     Do not lift more than 10 pounds for 6 weeks after surgery.  Ask family and friends for help when you need it.  No driving until you have stopped taking your pain medications (usually two weeks after surgery).  No heavy exercise or activity for 6 weeks.  Don't do anything that will put a strain on your surgery site.  Don't strain when using the toilet.  Your care team may prescribe a stool softener if you have problems with your bowel movements.     To care for your incision:     Keep the incision clean and dry.  Do not soak your incision in water. No swimming or hot tubs until it has fully healed. You may soak in the bathtub if the water level is below your incision.  Do not use peroxide, gel, cream, lotion, or ointment on your incision.  Adjust your clothes to avoid pressure on your surgery site (check the elastic in your underwear for example).     You may see a small amount of clear or pink drainage and this is normal.  Check with your health care provider:     If the drainage increases or has an odor.  If the incision reddens, you have swelling, or develop  a rash.  If you have increased pain and the medicine we prescribed doesn't help.  If you have a fever above 100.4 F (38 C) with or without chills when placing thermometer under your tongue.   The area around your incision (surgery wound), will feel numb.  This is normal. The numbness should go away in less than a year.     Keep your hands clean:  Always wash your hands before touching your incision (surgery wound). This helps reduce your risk of infection. If your hands aren't dirty, you may use an alcohol hand-rub to clean your hands. Keep your nails clean and short.    Call your healthcare provider if you have any of these symptoms:     You soak a sanitary pad with blood within 1 hour, or you see blood clots larger than a golf ball.  Bleeding that lasts more than 6 weeks.  Vaginal discharge that smells bad.  Severe pain, cramping or tenderness in your lower belly area.  A need to urinate more frequently (use the toilet more often), more urgently (use the toilet very quickly), or it burns when you urinate.  Nausea and vomiting.  Redness, swelling or pain around a vein in your leg.  Problems breastfeeding or a red or painful area on your breast.  Chest pain and cough or are gasping for air.  Problems with coping with sadness, anxiety or depression. If you have concerns about hurting yourself or the baby, call your provider immediately.    You have questions or concerns after you return home.

## 2023-12-02 NOTE — LACTATION NOTE
"Discharge lactation visit with Alena, FOB, and baby boy.    Alena shares she pumped and bottled with her first and knows she \"can do that\" if needed. Infant \"can latch well\" Alena shares but they are mostly pumping and bottling. Alena has a new pump for home use.    Answered all questions.     Faustina Salgado RN IBCLC  "

## 2023-12-02 NOTE — PROVIDER NOTIFICATION
12/01/23 2023   Provider Notification   Provider Name/Title Dr. Carbajal   Method of Notification Electronic Page   Request Evaluate-Remote   Notification Reason Status Update     Dr. Carbajal paged at 2023, call back received at 2026. Dr. Carbajal requested to evaluate the patient remotely because the patients c/s dressing is saturated and she noted blood on her towel after she took a shower. The patient had her dressing replaced this morning because it was saturated. There are no big gushes of blood, but the dressing has become slowly saturated throughout the day. According to the patient, the same thing happened with her last c/s. Dr. Carbajal ordered the RN to apply a pressure dressing. The provider will evaluate in person tomorrow. Will notify if there is drainage coming out from the pressure dressing or if patient develops any signs of infection.

## 2023-12-02 NOTE — PROVIDER NOTIFICATION
12/02/23 1315   Provider Notification   Provider Name/Title Dr Carbajal   Method of Notification Electronic Page   Request Evaluate-Remote   Notification Reason Status Update     MD notified with Pt incision dressing removed seems half way saturated with serous fluid and scent bleeding noted from right side of incision also 3 steri strips replaced from right side incision and pt prefer to go home today ordered to apply Abdominal pressure dressing so bleeding  not get in cloths and MD is Ok to discharge pt today and follow up in Monday also recommend give couple ABD  dressing to  home

## 2023-12-02 NOTE — PLAN OF CARE
D: VSS, assessments WDL.   I: Pt. received complete discharge paperwork and home medications as filled by discharge pharmacy.  Pt. was given times of last dose for all discharge medications in writing on discharge medication sheets.  Discharge teaching included home medication, pain management, activity restrictions, postpartum cares, and signs and symptoms of infection.    A: Discharge outcomes on care plan met.  Mother states understanding and comfort with self care and follow up care.   P: Pt. Discharged.  Pt. was accompanied by spouse and left with personal belongings. Pt. to follow up with OB provider per discharge instructions.  Pt. had no further questions at the time of discharge and no unmet needs were identified.

## 2023-12-02 NOTE — PROGRESS NOTES
Post-partum Note      S: Patient is doing well today.  Pain is controlled with PO medications.  Tolerating regular diet without nausea or vomiting.  Ambulating without dizziness.  Urinating without difficulty. Lochia normal.  Breastfeeding, pumping and supplementing with bottle too. Milk hasn't come in. Some right sided incisional pain with some increased drainage from that side. Was bloody last night, concerned as had an infection with last  section. Would like to go home if able     O:  Patient Vitals for the past 24 hrs:   BP Temp Temp src Pulse Resp   23 0754 112/51 98.4  F (36.9  C) Oral 65 16   23 0349 113/64 98  F (36.7  C) Oral 58 16   23 1922 132/74 97.7  F (36.5  C) Oral 62 18   23 1539 117/54 97.4  F (36.3  C) Oral 62 18   23 1214 107/60 97.6  F (36.4  C) Oral 58 18       Gen:  Resting comfortably, NAD  Pulm:  Breathing comfortably on room air  Abd:  Soft, appropriately ttp, non-distended.Fundus at  umbilicus, firm and non-tender.  Incision: intact. Mostly serous drainage from right side of incision. Midline with tiny amount of blood on palpation, no signficant bleeding. NO hematoma formation. No signs of infection. No erythema from incision   Ext:  non-tender, 1+  bilateral LE edema    No intake/output data recorded.    Hgb:     Hemoglobin   Date Value Ref Range Status   2023 11.5 (L) 11.7 - 15.7 g/dL Final   2023 14.0 11.7 - 15.7 g/dL Final   2020 11.8 11.7 - 15.7 g/dL Final   2020 13.2 11.7 - 15.7 g/dL Final       Assessment/Plan:  38 year old  on POD2 after rpt c/s. Pregnancy complicated   Maternal obesity  H/o depression & ADHD - no current meds (previously on Lexapro and Vyvanse), has therapist  H/o SAB x 2  H/o  x 1 for fetal intolerance; h/o wound infection post operatively Desired TOLAC if spont labor.  Homozygous MTHFR mutation, normal homocysteine level. No need for lovenox     1. Continue with routine postpartum  management  2. Incision is c/d/I with sutures   3. EBL: 440ml ; pre hemoglobin 14, post hemogobin 11.5, no symptoms of anemia.   4.   Lab Results   Component Value Date    ABO O 09/28/2020    RH Pos 09/28/2020    , Rubella immune  5. Feed: Breastfeeding  6. CV/RESP: vss  7. DVT PPX: ambultaion   8. Bleeding from incision last night, had pressure dressing applied, mostly serous drainage this am. Discussed fluid shifts in pp period. Pressure dressing replaced. Will take down around 1pm and see if bleeding stable. If stable, okay to go home. RN to reassess and call md prior to discharge, relayed to RN.     Dispo: Anticipate DC home POD2-3. Warning signs reviewed. Follow-up in 2 weeks for mood/incision check.     MD [facility]  12/2/2023, 8:42 AM

## 2023-12-02 NOTE — PLAN OF CARE
Vital signs stable. Postpartum assessment WDL. Incision dressing was half way saturated with serous fluid and scent bleeding from right side of incision dressing removed and 3 steri strips replaced MD notified ordered ABD dressing reapplied and keep open to air if no drainage seen pt wants to go home today MD is Ok to discharge today and follow up on Monday  pt  given instruction to change dressing pt verbalized understanding also  sent home with ABD dressing and foam given  Pain controlled with tylenol ,motrin and oxycodone . Patient up ambulating voiding without difficulty  . Patient  passing gas. Had bowel movement pumping and Bottle feeding formula and EBM  Patient and infant bonding well.discharge planning today  Will continue with current plan of care.

## 2023-12-06 NOTE — DISCHARGE SUMMARY
OB  Discharge Summary    DOA: 2023  DOD:  2023    Admission Diagnosis  1.  IUP @ 40w2d   2.  Previous  delivery  3.  Maternal obesity  4.  Maternal depression & ADHD    Discharge Diagnosis  1.  Same  2.  POD# 2 s/p RLTCS       HPI / Hospital Course:     Patient was admitted to labor and delivery for scheduled repeat  delivery. Her prenatal course was notable for:     Maternal obesity  H/o depression & ADHD - no current meds (previously on Lexapro and Vyvanse), has therapist  H/o SAB x 2  H/o  x 1 for fetal intolerance; h/o wound infection post operatively Desired TOLAC if spont labor.  Homozygous MTHFR mutation, normal homocysteine level. No need for lovenox     Appropriate consent forms were signed and preparations made to proceed for surgery.    Intra-operative course:  Patient was taken to the operating room for the above noted procedure. No complications.  For full details of intraoperative course, please see dictated operative note.      Post-operative course:   Patient's post-operative course was uncomplicated.  By post-operative day #2 she was ambulating, her pain was well-controlled on oral pain medications, her bleeding was minimal, she was tolerating PO, she was voiding and passing flatus.  Patient was deemed stable for discharge.  Her post-op Hgb was   Lab Results   Component Value Date    HGB 11.5 2023    HGB 14.0 2023    HGB 11.8 2020    HGB 13.2 2020   .  Her vital signs remained stable  On the day of discharge, her wound was without evidence of infection.      Post-op instructions:  1.  Patient was instructed to RTC in 2 & 6 weeks for post-partum visit.  2.  Patient was instructed to call MD for temperature greater than 100.4, foul smelling vaginal discharge, bleeding >1pad per hour, severe pain not controlled by pain medications, severe HA, redness/drainage from incision, asymmetric LE edema or with other concerns.  3.  Post-partum  mood symptoms discussed.  Pt will call with si/sx of depression.  4.  Patient instructed to avoid lifting >20# x 6 weeks, to avoid vigorous exercise x 6 weeks, to observe pelvic rest x 6 weeks (no tampons or IC) and to avoid driving while taking narcotics.  5.  Patient instructed to remove steri-strips after 1 week.       Post-op medications:  1.  Ibuprofen 600mg PO Q6hrs PRN pain  2.  Tylenol OTC prn  3.  Oxycodone 5mg PO Q4hrs PRN severe pain  4.  Senna or other stool softener as needed  5.  PNV  6.  Resume home medications    Savanna Link MD  12/5/2023  9:49 PM

## 2024-03-16 ENCOUNTER — HEALTH MAINTENANCE LETTER (OUTPATIENT)
Age: 39
End: 2024-03-16

## 2025-03-22 ENCOUNTER — HEALTH MAINTENANCE LETTER (OUTPATIENT)
Age: 40
End: 2025-03-22

## (undated) DEVICE — FILTER BERKLEY SAFETOUCH

## (undated) DEVICE — PACK C-SECTION LF PL15OTA83B

## (undated) DEVICE — SU VICRYL 0 CT-1 36" J346H

## (undated) DEVICE — BLADE CLIPPER 4406

## (undated) DEVICE — SU VICRYL 4-0 FS-1 27" J441H

## (undated) DEVICE — SU VICRYL 0 CT 36" J358H

## (undated) DEVICE — GLOVE PROTEXIS POWDER FREE 6.5 ORTHOPEDIC 2D73ET65

## (undated) DEVICE — PREP CHLORAPREP 26ML TINTED ORANGE  260815

## (undated) DEVICE — CATH TRAY FOLEY 16FR BARDEX W/DRAIN BAG STATLOCK 300316A

## (undated) DEVICE — SOL WATER IRRIG 1000ML BOTTLE 2F7114

## (undated) DEVICE — LINEN C-SECTION 5415

## (undated) DEVICE — SU VICRYL 2-0 CT-1 27" J339H

## (undated) DEVICE — LINEN TOWEL PACK X5 5464

## (undated) DEVICE — SUCTION CANNULA UTERINE 08MM CVD 022108-10

## (undated) DEVICE — GLOVE PROTEXIS MICRO 6.5  2D73PM65

## (undated) DEVICE — PREP CHLORAPREP 26ML TINTED HI-LITE ORANGE 930815

## (undated) DEVICE — SPECIMEN TRAP VACUUM SUCTION 003984-901

## (undated) DEVICE — Device

## (undated) DEVICE — DECANTER BAG 2002S

## (undated) DEVICE — GLOVE BIOGEL PI MICRO SZ 6.0 48560

## (undated) DEVICE — ESU GROUND PAD UNIVERSAL W/O CORD

## (undated) DEVICE — LINEN FULL SHEET 5511

## (undated) DEVICE — SU VICRYL 0 CT-1 27" J340H

## (undated) DEVICE — BAG CLEAR TRASH 1.3M 39X33" P4040C

## (undated) DEVICE — SOL NACL 0.9% IRRIG 1000ML BOTTLE 2F7124

## (undated) DEVICE — TUBING SUCTION VACUUM COLLECTION 6FT 610

## (undated) DEVICE — SUCTION CANISTER MEDIVAC LINER 3000ML W/LID 65651-530

## (undated) DEVICE — SOL NACL 0.9% IRRIG 1000ML BOTTLE 07138-09

## (undated) DEVICE — SU MONOCRYL 0 CT-1 36" UND Y946H

## (undated) DEVICE — LINEN HALF SHEET 5512

## (undated) DEVICE — PAD CHUX UNDERPAD 30X36" P3036C

## (undated) DEVICE — PACK MINOR LITHOTOMY RIDGES

## (undated) DEVICE — GLOVE BIOGEL PI MICRO INDICATOR UNDERGLOVE SZ 6.5 48965

## (undated) DEVICE — SU VICRYL 3-0 CT-1 36" J338H

## (undated) RX ORDER — KETOROLAC TROMETHAMINE 30 MG/ML
INJECTION, SOLUTION INTRAMUSCULAR; INTRAVENOUS
Status: DISPENSED
Start: 2023-11-30

## (undated) RX ORDER — DOXYCYCLINE 100 MG/1
CAPSULE ORAL
Status: DISPENSED
Start: 2023-01-19

## (undated) RX ORDER — ONDANSETRON 2 MG/ML
INJECTION INTRAMUSCULAR; INTRAVENOUS
Status: DISPENSED
Start: 2020-09-28

## (undated) RX ORDER — FENTANYL CITRATE 50 UG/ML
INJECTION, SOLUTION INTRAMUSCULAR; INTRAVENOUS
Status: DISPENSED
Start: 2023-01-19

## (undated) RX ORDER — MORPHINE SULFATE 1 MG/ML
INJECTION, SOLUTION EPIDURAL; INTRATHECAL; INTRAVENOUS
Status: DISPENSED
Start: 2020-09-28

## (undated) RX ORDER — OXYTOCIN/0.9 % SODIUM CHLORIDE 30/500 ML
PLASTIC BAG, INJECTION (ML) INTRAVENOUS
Status: DISPENSED
Start: 2020-09-28

## (undated) RX ORDER — MORPHINE SULFATE 1 MG/ML
INJECTION, SOLUTION EPIDURAL; INTRATHECAL; INTRAVENOUS
Status: DISPENSED
Start: 2023-11-30

## (undated) RX ORDER — LIDOCAINE HCL/EPINEPHRINE/PF 2%-1:200K
VIAL (ML) INJECTION
Status: DISPENSED
Start: 2020-09-28

## (undated) RX ORDER — KETOROLAC TROMETHAMINE 15 MG/ML
INJECTION, SOLUTION INTRAMUSCULAR; INTRAVENOUS
Status: DISPENSED
Start: 2023-01-19

## (undated) RX ORDER — DEXAMETHASONE SODIUM PHOSPHATE 4 MG/ML
INJECTION, SOLUTION INTRA-ARTICULAR; INTRALESIONAL; INTRAMUSCULAR; INTRAVENOUS; SOFT TISSUE
Status: DISPENSED
Start: 2023-01-19